# Patient Record
Sex: FEMALE | Race: WHITE | NOT HISPANIC OR LATINO | Employment: FULL TIME | URBAN - METROPOLITAN AREA
[De-identification: names, ages, dates, MRNs, and addresses within clinical notes are randomized per-mention and may not be internally consistent; named-entity substitution may affect disease eponyms.]

---

## 2017-05-08 ENCOUNTER — HOSPITAL ENCOUNTER (OUTPATIENT)
Dept: OTHER | Facility: HOSPITAL | Age: 47
Discharge: HOME | End: 2017-05-08
Attending: FAMILY MEDICINE | Admitting: FAMILY MEDICINE

## 2017-05-12 ENCOUNTER — CONVERSION ENCOUNTER (OUTPATIENT)
Dept: FAMILY MEDICINE CLINIC | Facility: CLINIC | Age: 47
End: 2017-05-12

## 2017-05-13 LAB
ALBUMIN SERPL-MCNC: 3.9 G/DL (ref 3.6–5.1)
ALP SERPL-CCNC: 53 U/L (ref 33–115)
AST SERPL-CCNC: 19 U/L (ref 10–30)
BILIRUB SERPL-MCNC: 0.6 MG/DL (ref 0.2–1.2)
BUN SERPL-MCNC: 12 MG/DL (ref 7–25)
BUN/CREAT SERPL: 17.1 (CALC) (ref 6–22)
CALCIUM SERPL-MCNC: 9.1 MG/DL (ref 8.6–10.2)
CHLORIDE SERPL-SCNC: 105 MMOL/L (ref 98–110)
CHOLEST SERPL-MCNC: 213 MG/DL (ref 125–200)
CONV CO2: 24 MMOL/L (ref 21–33)
CONV TOTAL PROTEIN: 6.4 G/DL (ref 6.2–8.3)
CREAT UR-MCNC: 0.7 MG/DL (ref 0.59–1.07)
EOSINOPHIL # BLD AUTO: 200 CELLS/UL (ref 15–500)
EOSINOPHIL # BLD AUTO: 3 %
ERYTHROCYTE [DISTWIDTH] IN BLOOD BY AUTOMATED COUNT: 12.5 % (ref 11–15)
GLOBULIN UR ELPH-MCNC: 2.5 MG/DL (ref 2.2–3.9)
GLUCOSE UR QL: 99 MG/DL (ref 65–99)
HCT VFR BLD AUTO: 38.7 % (ref 35–45)
HDLC SERPL-MCNC: 52 MG/DL
HGB BLD-MCNC: 13.1 G/DL (ref 11.7–15.5)
INSULIN SERPL-ACNC: 1.6 (CALC) (ref 1–2.1)
LDLC SERPL CALC-MCNC: 128 MG/DL
LYMPHOCYTES # BLD AUTO: 1500 CELLS/UL (ref 850–3900)
LYMPHOCYTES NFR BLD AUTO: 23 %
MCH RBC QN AUTO: 31 PG (ref 27–33)
MCHC RBC AUTO-ENTMCNC: 33.9 G/DL (ref 32–36)
MCV RBC AUTO: 91.2 FL (ref 80–100)
MONOCYTES # BLD AUTO: 300 CELLS/UL (ref 200–950)
MONOCYTES NFR BLD AUTO: 5 %
NEUTROPHILS # BLD AUTO: 4300 CELLS/UL (ref 1500–7800)
NEUTROPHILS NFR BLD AUTO: 68 %
PLATELET # BLD AUTO: 280 10*3/UL (ref 140–400)
PMV BLD AUTO: 8.9 FL (ref 7.5–11.5)
POTASSIUM SERPL-SCNC: 4.4 MMOL/L (ref 3.5–5.3)
RBC # BLD AUTO: 4.24 MILLION/UL (ref 3.8–5.1)
SODIUM SERPL-SCNC: 139 MMOL/L (ref 135–146)
TRIGL SERPL-MCNC: 164 MG/DL
TSH SERPL-ACNC: 2.07 MIU/L
WBC # BLD AUTO: 6.3 10*3/UL (ref 3.8–10.8)

## 2018-03-08 ENCOUNTER — OFFICE (AMBULATORY)
Dept: URBAN - METROPOLITAN AREA CLINIC 64 | Facility: CLINIC | Age: 48
End: 2018-03-08

## 2018-03-08 VITALS
HEART RATE: 71 BPM | DIASTOLIC BLOOD PRESSURE: 75 MMHG | HEIGHT: 61 IN | SYSTOLIC BLOOD PRESSURE: 119 MMHG | WEIGHT: 140 LBS

## 2018-03-08 DIAGNOSIS — K57.30 DIVERTICULOSIS OF LARGE INTESTINE WITHOUT PERFORATION OR ABS: ICD-10-CM

## 2018-03-08 DIAGNOSIS — K64.8 OTHER HEMORRHOIDS: ICD-10-CM

## 2018-03-08 DIAGNOSIS — K21.9 GASTRO-ESOPHAGEAL REFLUX DISEASE WITHOUT ESOPHAGITIS: ICD-10-CM

## 2018-03-08 DIAGNOSIS — R13.10 DYSPHAGIA, UNSPECIFIED: ICD-10-CM

## 2018-03-08 DIAGNOSIS — K59.00 CONSTIPATION, UNSPECIFIED: ICD-10-CM

## 2018-03-08 DIAGNOSIS — Z80.0 FAMILY HISTORY OF MALIGNANT NEOPLASM OF DIGESTIVE ORGANS: ICD-10-CM

## 2018-03-08 PROCEDURE — 99203 OFFICE O/P NEW LOW 30 MIN: CPT | Performed by: NURSE PRACTITIONER

## 2018-03-08 RX ORDER — SUCRALFATE 1 G/10ML
800 SUSPENSION ORAL
Qty: 500 | Refills: 0 | Status: COMPLETED
Start: 2018-03-08 | End: 2018-03-16

## 2018-03-19 ENCOUNTER — OFFICE (AMBULATORY)
Dept: URBAN - METROPOLITAN AREA PATHOLOGY 4 | Facility: PATHOLOGY | Age: 48
End: 2018-03-19
Payer: COMMERCIAL

## 2018-03-19 ENCOUNTER — ON CAMPUS - OUTPATIENT (AMBULATORY)
Dept: URBAN - METROPOLITAN AREA HOSPITAL 2 | Facility: HOSPITAL | Age: 48
End: 2018-03-19
Payer: COMMERCIAL

## 2018-03-19 VITALS
SYSTOLIC BLOOD PRESSURE: 154 MMHG | OXYGEN SATURATION: 99 % | SYSTOLIC BLOOD PRESSURE: 100 MMHG | SYSTOLIC BLOOD PRESSURE: 88 MMHG | OXYGEN SATURATION: 100 % | SYSTOLIC BLOOD PRESSURE: 119 MMHG | HEART RATE: 79 BPM | HEART RATE: 100 BPM | HEIGHT: 61 IN | HEART RATE: 114 BPM | WEIGHT: 138 LBS | RESPIRATION RATE: 18 BRPM | DIASTOLIC BLOOD PRESSURE: 69 MMHG | SYSTOLIC BLOOD PRESSURE: 106 MMHG | SYSTOLIC BLOOD PRESSURE: 138 MMHG | HEART RATE: 89 BPM | SYSTOLIC BLOOD PRESSURE: 91 MMHG | RESPIRATION RATE: 16 BRPM | DIASTOLIC BLOOD PRESSURE: 91 MMHG | TEMPERATURE: 97.5 F | HEART RATE: 84 BPM | HEART RATE: 72 BPM | DIASTOLIC BLOOD PRESSURE: 53 MMHG | DIASTOLIC BLOOD PRESSURE: 59 MMHG | HEART RATE: 92 BPM | HEART RATE: 75 BPM | SYSTOLIC BLOOD PRESSURE: 103 MMHG | DIASTOLIC BLOOD PRESSURE: 79 MMHG | DIASTOLIC BLOOD PRESSURE: 62 MMHG | DIASTOLIC BLOOD PRESSURE: 61 MMHG | DIASTOLIC BLOOD PRESSURE: 57 MMHG | SYSTOLIC BLOOD PRESSURE: 130 MMHG | OXYGEN SATURATION: 98 % | OXYGEN SATURATION: 96 %

## 2018-03-19 DIAGNOSIS — R13.10 DYSPHAGIA, UNSPECIFIED: ICD-10-CM

## 2018-03-19 DIAGNOSIS — D12.2 BENIGN NEOPLASM OF ASCENDING COLON: ICD-10-CM

## 2018-03-19 DIAGNOSIS — K21.9 GASTRO-ESOPHAGEAL REFLUX DISEASE WITHOUT ESOPHAGITIS: ICD-10-CM

## 2018-03-19 DIAGNOSIS — K62.1 RECTAL POLYP: ICD-10-CM

## 2018-03-19 DIAGNOSIS — K64.8 OTHER HEMORRHOIDS: ICD-10-CM

## 2018-03-19 DIAGNOSIS — K57.30 DIVERTICULOSIS OF LARGE INTESTINE WITHOUT PERFORATION OR ABS: ICD-10-CM

## 2018-03-19 DIAGNOSIS — K44.9 DIAPHRAGMATIC HERNIA WITHOUT OBSTRUCTION OR GANGRENE: ICD-10-CM

## 2018-03-19 DIAGNOSIS — K22.2 ESOPHAGEAL OBSTRUCTION: ICD-10-CM

## 2018-03-19 DIAGNOSIS — Z80.0 FAMILY HISTORY OF MALIGNANT NEOPLASM OF DIGESTIVE ORGANS: ICD-10-CM

## 2018-03-19 LAB
GI HISTOLOGY: A. UNSPECIFIED: (no result)
GI HISTOLOGY: B. UNSPECIFIED: (no result)
GI HISTOLOGY: PDF REPORT: (no result)

## 2018-03-19 PROCEDURE — 43249 ESOPH EGD DILATION <30 MM: CPT | Mod: 59 | Performed by: INTERNAL MEDICINE

## 2018-03-19 PROCEDURE — 45380 COLONOSCOPY AND BIOPSY: CPT | Mod: 33 | Performed by: INTERNAL MEDICINE

## 2018-03-19 PROCEDURE — 88305 TISSUE EXAM BY PATHOLOGIST: CPT | Mod: 33 | Performed by: INTERNAL MEDICINE

## 2018-03-19 RX ADMIN — PROPOFOL: 10 INJECTION, EMULSION INTRAVENOUS at 14:47

## 2018-05-21 ENCOUNTER — HOSPITAL ENCOUNTER (OUTPATIENT)
Dept: GENERAL RADIOLOGY | Facility: HOSPITAL | Age: 48
Discharge: HOME | End: 2018-05-21
Attending: FAMILY MEDICINE | Admitting: FAMILY MEDICINE

## 2018-05-21 ENCOUNTER — CONVERSION ENCOUNTER (OUTPATIENT)
Dept: FAMILY MEDICINE CLINIC | Facility: CLINIC | Age: 48
End: 2018-05-21

## 2018-05-22 LAB
ALBUMIN SERPL-MCNC: 4 G/DL (ref 3.6–5.1)
ALP SERPL-CCNC: 56 U/L (ref 33–115)
AST SERPL-CCNC: 26 U/L (ref 10–30)
BILIRUB SERPL-MCNC: 0.6 MG/DL (ref 0.2–1.2)
BUN SERPL-MCNC: 15 MG/DL (ref 7–25)
BUN/CREAT SERPL: 18.8 (CALC) (ref 6–22)
CALCIUM SERPL-MCNC: 9.4 MG/DL (ref 8.6–10.2)
CHLORIDE SERPL-SCNC: 104 MMOL/L (ref 98–110)
CHOLEST SERPL-MCNC: 195 MG/DL (ref 125–200)
CONV CO2: 25 MMOL/L (ref 21–33)
CONV TOTAL PROTEIN: 6.4 G/DL (ref 6.2–8.3)
CREAT UR-MCNC: 0.8 MG/DL (ref 0.59–1.07)
EOSINOPHIL # BLD AUTO: 300 CELLS/UL (ref 15–500)
EOSINOPHIL # BLD AUTO: 5 %
ERYTHROCYTE [DISTWIDTH] IN BLOOD BY AUTOMATED COUNT: 12.8 % (ref 11–15)
GLOBULIN UR ELPH-MCNC: 2.4 MG/DL (ref 2.2–3.9)
GLUCOSE UR QL: 105 MG/DL (ref 65–99)
HCT VFR BLD AUTO: 37.4 % (ref 35–45)
HDLC SERPL-MCNC: 56 MG/DL
HGB BLD-MCNC: 12.6 G/DL (ref 11.7–15.5)
INSULIN SERPL-ACNC: 1.7 (CALC) (ref 1–2.1)
LDLC SERPL CALC-MCNC: 111 MG/DL
LYMPHOCYTES # BLD AUTO: 1800 CELLS/UL (ref 850–3900)
LYMPHOCYTES NFR BLD AUTO: 31 %
MCH RBC QN AUTO: 30.8 PG (ref 27–33)
MCHC RBC AUTO-ENTMCNC: 33.7 G/DL (ref 32–36)
MCV RBC AUTO: 91.5 FL (ref 80–100)
MONOCYTES # BLD AUTO: 400 CELLS/UL (ref 200–950)
MONOCYTES NFR BLD AUTO: 7 %
NEUTROPHILS # BLD AUTO: 3400 CELLS/UL (ref 1500–7800)
NEUTROPHILS NFR BLD AUTO: 57 %
PLATELET # BLD AUTO: 291 10*3/UL (ref 140–400)
PMV BLD AUTO: 8.7 FL (ref 7.5–11.5)
POTASSIUM SERPL-SCNC: 4.1 MMOL/L (ref 3.5–5.3)
RBC # BLD AUTO: 4.09 MILLION/UL (ref 3.8–5.1)
SODIUM SERPL-SCNC: 137 MMOL/L (ref 135–146)
TRIGL SERPL-MCNC: 141 MG/DL
TSH SERPL-ACNC: 1.79 MIU/L
WBC # BLD AUTO: 5.9 10*3/UL (ref 3.8–10.8)

## 2019-05-02 ENCOUNTER — HOSPITAL ENCOUNTER (OUTPATIENT)
Dept: OTHER | Facility: HOSPITAL | Age: 49
Discharge: HOME | End: 2019-05-02
Attending: FAMILY MEDICINE | Admitting: FAMILY MEDICINE

## 2019-05-14 ENCOUNTER — HOSPITAL ENCOUNTER (OUTPATIENT)
Dept: MAMMOGRAPHY | Facility: HOSPITAL | Age: 49
Discharge: HOME | End: 2019-05-14
Attending: FAMILY MEDICINE | Admitting: FAMILY MEDICINE

## 2019-12-05 ENCOUNTER — OFFICE VISIT (OUTPATIENT)
Dept: FAMILY MEDICINE CLINIC | Facility: CLINIC | Age: 49
End: 2019-12-05

## 2019-12-05 ENCOUNTER — HOSPITAL ENCOUNTER (OUTPATIENT)
Dept: GENERAL RADIOLOGY | Facility: HOSPITAL | Age: 49
Discharge: HOME | End: 2019-12-05
Admitting: FAMILY MEDICINE

## 2019-12-05 VITALS
HEART RATE: 103 BPM | BODY MASS INDEX: 27.38 KG/M2 | TEMPERATURE: 98.6 F | DIASTOLIC BLOOD PRESSURE: 68 MMHG | RESPIRATION RATE: 16 BRPM | HEIGHT: 61 IN | OXYGEN SATURATION: 97 % | WEIGHT: 145 LBS | SYSTOLIC BLOOD PRESSURE: 112 MMHG

## 2019-12-05 DIAGNOSIS — E66.3 OVERWEIGHT (BMI 25.0-29.9): Primary | ICD-10-CM

## 2019-12-05 DIAGNOSIS — Z87.891 HISTORY OF TOBACCO USE: ICD-10-CM

## 2019-12-05 DIAGNOSIS — R07.81 RIB PAIN ON LEFT SIDE: ICD-10-CM

## 2019-12-05 PROBLEM — Z12.31 ENCOUNTER FOR SCREENING MAMMOGRAM FOR MALIGNANT NEOPLASM OF BREAST: Status: ACTIVE | Noted: 2019-12-05

## 2019-12-05 PROBLEM — M54.50 ACUTE BILATERAL LOW BACK PAIN WITHOUT SCIATICA: Status: ACTIVE | Noted: 2019-12-05

## 2019-12-05 PROBLEM — K58.9 IRRITABLE COLON: Status: ACTIVE | Noted: 2019-12-05

## 2019-12-05 PROBLEM — J30.1 ALLERGIC RHINITIS DUE TO POLLEN: Status: ACTIVE | Noted: 2019-12-05

## 2019-12-05 PROBLEM — R40.0 DAYTIME SOMNOLENCE: Status: ACTIVE | Noted: 2019-12-05

## 2019-12-05 PROBLEM — R92.2 DENSE BREAST: Status: ACTIVE | Noted: 2019-12-05

## 2019-12-05 PROBLEM — R92.30 DENSE BREAST: Status: ACTIVE | Noted: 2019-12-05

## 2019-12-05 PROBLEM — Z00.01 ENCOUNTER FOR ANNUAL GENERAL MEDICAL EXAMINATION WITH ABNORMAL FINDINGS IN ADULT: Status: ACTIVE | Noted: 2019-12-05

## 2019-12-05 PROBLEM — F41.9 ANXIETY: Status: ACTIVE | Noted: 2019-12-05

## 2019-12-05 PROBLEM — R56.9 CONVULSIONS: Status: ACTIVE | Noted: 2019-12-05

## 2019-12-05 PROBLEM — K21.9 GASTROESOPHAGEAL REFLUX DISEASE: Status: ACTIVE | Noted: 2019-12-05

## 2019-12-05 PROCEDURE — 71101 X-RAY EXAM UNILAT RIBS/CHEST: CPT

## 2019-12-05 PROCEDURE — 99214 OFFICE O/P EST MOD 30 MIN: CPT | Performed by: FAMILY MEDICINE

## 2019-12-05 RX ORDER — POLYETHYLENE GLYCOL 3350 17 G/17G
POWDER, FOR SOLUTION ORAL DAILY
COMMUNITY

## 2019-12-05 RX ORDER — FEXOFENADINE HCL 180 MG/1
TABLET ORAL DAILY
COMMUNITY

## 2019-12-05 NOTE — PROGRESS NOTES
Subjective   Aure Bernard is a 49 y.o. female.     Chief Complaint   Patient presents with   • Chest Pain     pain in left rib area       Chest Pain    This is a new problem. The current episode started more than 1 month ago. The problem occurs constantly. The problem has been gradually worsening. The pain is moderate. The quality of the pain is described as pressure. The pain does not radiate. Pertinent negatives include no abdominal pain, cough, diaphoresis, fever, irregular heartbeat, nausea, palpitations, shortness of breath or vomiting. Associated with: position in how she sits or leans. She has tried rest for the symptoms. The treatment provided no relief.   Pertinent negatives for past medical history include no seizures.        The following portions of the patient's history were reviewed and updated as appropriate: allergies, current medications, past family history, past medical history, past social history, past surgical history and problem list.    Allergies:  No Known Allergies    Social History:  Social History     Socioeconomic History   • Marital status:      Spouse name: Not on file   • Number of children: Not on file   • Years of education: Not on file   • Highest education level: Not on file   Tobacco Use   • Smoking status: Former Smoker     Types: Cigarettes   • Smokeless tobacco: Never Used   • Tobacco comment: quit [id number]   Substance and Sexual Activity   • Alcohol use: Yes   • Drug use: No   • Sexual activity: Defer       Family History:  History reviewed. No pertinent family history.    Past Medical History :  Patient Active Problem List   Diagnosis   • Acute bilateral low back pain without sciatica   • Allergic rhinitis due to pollen   • Anxiety   • Convulsions (CMS/HCC)   • Daytime somnolence   • Dense breast   • Gastroesophageal reflux disease   • Irritable colon   • Overweight (BMI 25.0-29.9)   • Encounter for screening mammogram for malignant neoplasm of breast   • Encounter for  "annual general medical examination with abnormal findings in adult   • History of tobacco use   • Opacity of lung on imaging study       Medication List:    Current Outpatient Medications:   •  fexofenadine (ALLEGRA) 180 MG tablet, Take  by mouth Daily., Disp: , Rfl:   •  Omeprazole 20 MG Tablet Delayed Release Dispersible, Take  by mouth Daily., Disp: , Rfl:   •  polyethylene glycol (MIRALAX) powder, Take  by mouth Daily., Disp: , Rfl:     Past Surgical History:  History reviewed. No pertinent surgical history.    Review of Systems:  Review of Systems   Constitutional: Negative for chills, diaphoresis and fever.   Eyes: Negative for visual disturbance.   Respiratory: Negative for cough and shortness of breath.    Cardiovascular: Positive for chest pain. Negative for palpitations.   Gastrointestinal: Negative for abdominal pain, nausea and vomiting.   Endocrine: Negative for polydipsia and polyphagia.   Musculoskeletal: Negative for neck stiffness.   Skin: Negative for color change and pallor.   Neurological: Negative for seizures and syncope.   Hematological: Negative for adenopathy.       Physical Exam:  Vital Signs:  Visit Vitals  /68 (BP Location: Right arm, Patient Position: Sitting, Cuff Size: Adult)   Pulse 103   Temp 98.6 °F (37 °C)   Resp 16   Ht 154.9 cm (61\")   Wt 65.8 kg (145 lb)   SpO2 97%   Breastfeeding No   BMI 27.40 kg/m²       Physical Exam   Constitutional: She is oriented to person, place, and time. She appears well-developed and well-nourished. She is cooperative.   Cardiovascular: Normal rate, regular rhythm and normal heart sounds. Exam reveals no gallop and no friction rub.   No murmur heard.  Pulmonary/Chest: Effort normal and breath sounds normal. She has no wheezes. She has no rales. She exhibits no tenderness.   Neurological: She is alert and oriented to person, place, and time. Coordination normal.   Skin: Skin is warm and dry.   Psychiatric: She has a normal mood and affect. "   Vitals reviewed.      Assessment and Plan:  Problem List Items Addressed This Visit        Other    Overweight (BMI 25.0-29.9) - Primary    History of tobacco use      Other Visit Diagnoses     Rib pain on left side        Relevant Orders    XR Ribs Left With PA Chest (Completed)        Chest exam negative. Will get xray. Coming from her back?    An After Visit Summary and PPPS were given to the patient.

## 2019-12-09 ENCOUNTER — TELEPHONE (OUTPATIENT)
Dept: FAMILY MEDICINE CLINIC | Facility: CLINIC | Age: 49
End: 2019-12-09

## 2019-12-09 DIAGNOSIS — R93.89 ABNORMAL X-RAY: Primary | ICD-10-CM

## 2019-12-09 PROBLEM — R91.8 OPACITY OF LUNG ON IMAGING STUDY: Status: ACTIVE | Noted: 2019-12-09

## 2019-12-09 NOTE — TELEPHONE ENCOUNTER
----- Message from Alia Roe MD sent at 12/5/2019  5:37 PM EST -----  There is a spot in the lower left lung. I need a ct of the chest, I believe without contrast

## 2019-12-10 ENCOUNTER — HOSPITAL ENCOUNTER (OUTPATIENT)
Dept: CT IMAGING | Facility: HOSPITAL | Age: 49
Discharge: HOME | End: 2019-12-10
Admitting: FAMILY MEDICINE

## 2019-12-10 DIAGNOSIS — R93.89 ABNORMAL X-RAY: ICD-10-CM

## 2019-12-10 PROCEDURE — 71250 CT THORAX DX C-: CPT

## 2019-12-10 NOTE — PROGRESS NOTES
CT came back fine as far as the spot on her lung. She has a hiatal hernia (that is part of the stomach slides into the chest, causes heart burn), She has some scarring that we all get living in the [facility]. Otherwise normal

## 2020-12-15 ENCOUNTER — HOSPITAL ENCOUNTER (OUTPATIENT)
Dept: GENERAL RADIOLOGY | Facility: HOSPITAL | Age: 50
Discharge: HOME | End: 2020-12-15
Admitting: FAMILY MEDICINE

## 2020-12-15 ENCOUNTER — OFFICE VISIT (OUTPATIENT)
Dept: FAMILY MEDICINE CLINIC | Facility: CLINIC | Age: 50
End: 2020-12-15

## 2020-12-15 VITALS
WEIGHT: 144 LBS | HEART RATE: 97 BPM | RESPIRATION RATE: 18 BRPM | DIASTOLIC BLOOD PRESSURE: 86 MMHG | SYSTOLIC BLOOD PRESSURE: 124 MMHG | HEIGHT: 61 IN | BODY MASS INDEX: 27.19 KG/M2 | OXYGEN SATURATION: 97 % | TEMPERATURE: 97.1 F

## 2020-12-15 DIAGNOSIS — Z12.31 SCREENING MAMMOGRAM, ENCOUNTER FOR: ICD-10-CM

## 2020-12-15 DIAGNOSIS — N95.1 MENOPAUSAL SYMPTOM: ICD-10-CM

## 2020-12-15 DIAGNOSIS — M25.511 ACUTE PAIN OF RIGHT SHOULDER: ICD-10-CM

## 2020-12-15 DIAGNOSIS — Z00.00 ENCOUNTER FOR WELLNESS EXAMINATION IN ADULT: Primary | ICD-10-CM

## 2020-12-15 DIAGNOSIS — Z13.220 SCREENING FOR HYPERLIPIDEMIA: ICD-10-CM

## 2020-12-15 DIAGNOSIS — Z90.710 H/O: HYSTERECTOMY: ICD-10-CM

## 2020-12-15 PROBLEM — R91.8 OPACITY OF LUNG ON IMAGING STUDY: Status: RESOLVED | Noted: 2019-12-09 | Resolved: 2020-12-15

## 2020-12-15 PROBLEM — M54.50 ACUTE BILATERAL LOW BACK PAIN WITHOUT SCIATICA: Status: RESOLVED | Noted: 2019-12-05 | Resolved: 2020-12-15

## 2020-12-15 LAB
BILIRUB BLD-MCNC: NEGATIVE MG/DL
CLARITY, POC: CLEAR
COLOR UR: YELLOW
GLUCOSE UR STRIP-MCNC: NEGATIVE MG/DL
KETONES UR QL: NEGATIVE
LEUKOCYTE EST, POC: NEGATIVE
NITRITE UR-MCNC: NEGATIVE MG/ML
PH UR: 6 [PH] (ref 5–8)
PROT UR STRIP-MCNC: NEGATIVE MG/DL
RBC # UR STRIP: NEGATIVE /UL
SP GR UR: 1.02 (ref 1–1.03)
UROBILINOGEN UR QL: NORMAL

## 2020-12-15 PROCEDURE — 99213 OFFICE O/P EST LOW 20 MIN: CPT | Performed by: FAMILY MEDICINE

## 2020-12-15 PROCEDURE — 81003 URINALYSIS AUTO W/O SCOPE: CPT | Performed by: FAMILY MEDICINE

## 2020-12-15 PROCEDURE — 99396 PREV VISIT EST AGE 40-64: CPT | Performed by: FAMILY MEDICINE

## 2020-12-15 PROCEDURE — 73030 X-RAY EXAM OF SHOULDER: CPT

## 2020-12-15 NOTE — PROGRESS NOTES
"  Chief Complaint   Patient presents with   • Annual Exam   • Dizziness   • Shoulder Pain         Subjective   Aure Bernard is a 50 y.o. female here for a Well Woman Visit. Energy level is described as fair and she is sleeping well. She sleeps 8 hours nightly. Hysterectomy. Contraception: none. Patient exercises regularly 3 times weekly. Nutrition is described as in general, a \"healthy\" diet  . Her   libido is abnormal. She reports that she does perform monthly self breast exam.      Everytime pt bends over she gets dizzy.   right arm pain muscle pain.       Dizziness  This is a new problem. The current episode started 1 to 4 weeks ago. The problem occurs constantly. The problem has been gradually worsening. Associated symptoms include fatigue and headaches. Pertinent negatives include no abdominal pain, arthralgias, change in bowel habit, chest pain, chills, congestion, coughing, fever, joint swelling, nausea, numbness, rash, sore throat, swollen glands, visual change or vomiting. The symptoms are aggravated by bending. She has tried nothing for the symptoms. The treatment provided no relief.   Arm Pain   There was no injury mechanism. The pain is present in the right shoulder (right upper arm). The quality of the pain is described as aching and cramping (feels like muscle is pulling). The pain does not radiate. The pain has been fluctuating since the incident. Pertinent negatives include no chest pain, muscle weakness, numbness or tingling. Nothing aggravates the symptoms. She has tried nothing for the symptoms. The treatment provided no relief.        I personally reviewed and updated the patient's allergies, medications, problem list, and past medical, surgical, social, and family history.     Allergies:  No Known Allergies    Social History:  Social History     Socioeconomic History   • Marital status:      Spouse name: Not on file   • Number of children: Not on file   • Years of education: Not on file "   • Highest education level: Not on file   Tobacco Use   • Smoking status: Former Smoker     Types: Cigarettes   • Smokeless tobacco: Never Used   • Tobacco comment: quit [id number]   Substance and Sexual Activity   • Alcohol use: Yes   • Drug use: No   • Sexual activity: Defer       Family History:  Family History   Problem Relation Age of Onset   • Colon cancer Maternal Grandfather        Past Medical History :  Active Ambulatory Problems     Diagnosis Date Noted   • Allergic rhinitis due to pollen 12/05/2019   • Anxiety 12/05/2019   • Convulsions (CMS/HCC) 12/05/2019   • Daytime somnolence 12/05/2019   • Dense breast 12/05/2019   • Gastroesophageal reflux disease 12/05/2019   • Irritable colon 12/05/2019   • Overweight (BMI 25.0-29.9) 12/05/2019   • Encounter for screening mammogram for malignant neoplasm of breast 12/05/2019   • Encounter for annual general medical examination with abnormal findings in adult 12/05/2019   • History of tobacco use 12/05/2019   • H/O: hysterectomy 12/15/2020   • Acute pain of right shoulder 12/15/2020   • Screening mammogram, encounter for 12/15/2020   • Menopausal symptom 12/15/2020     Resolved Ambulatory Problems     Diagnosis Date Noted   • Acute bilateral low back pain without sciatica 12/05/2019   • Opacity of lung on imaging study 12/09/2019     Past Medical History:   Diagnosis Date   • GERD with esophagitis    • Headache    • Seizures (CMS/HCC)        Medication List:    Current Outpatient Medications:   •  fexofenadine (ALLEGRA) 180 MG tablet, Take  by mouth Daily., Disp: , Rfl:   •  Omeprazole 20 MG Tablet Delayed Release Dispersible, Take  by mouth Daily., Disp: , Rfl:   •  polyethylene glycol (MIRALAX) powder, Take  by mouth Daily., Disp: , Rfl:     Past Surgical History:  History reviewed. No pertinent surgical history.    Depression Screen:   PHQ-2/PHQ-9 Depression Screening 12/5/2019   Little interest or pleasure in doing things 0   Feeling down, depressed, or hopeless  "0   Total Score 0       Fall Risk Screen:  LARRY Fall Risk Assessment has not been completed.    Review Of Systems:  Review of Systems   Constitutional: Positive for fatigue. Negative for activity change, chills and fever.   HENT: Negative for congestion, ear pain, postnasal drip, rhinorrhea, sinus pressure, sneezing, sore throat, swollen glands, trouble swallowing and voice change.    Eyes: Negative for pain, redness, itching and visual disturbance.   Respiratory: Negative for cough, shortness of breath and wheezing.    Cardiovascular: Negative for chest pain.   Gastrointestinal: Negative for abdominal pain, change in bowel habit, diarrhea, nausea and vomiting.   Endocrine: Negative for cold intolerance and heat intolerance.   Genitourinary: Negative for frequency and urgency.   Musculoskeletal: Negative for arthralgias and joint swelling.   Skin: Negative for rash.   Neurological: Positive for dizziness. Negative for tingling, syncope and numbness.   Hematological: Does not bruise/bleed easily.   Psychiatric/Behavioral: Negative for depressed mood. The patient is not nervous/anxious.        Physical Exam:  Vital Signs:  Visit Vitals  /86   Pulse 97   Temp 97.1 °F (36.2 °C)   Resp 18   Ht 154.9 cm (61\")   Wt 65.3 kg (144 lb)   SpO2 97%   BMI 27.21 kg/m²       Physical Exam  Vitals signs reviewed.   Constitutional:       Appearance: Normal appearance. She is well-developed.   HENT:      Head: Normocephalic and atraumatic.      Right Ear: External ear normal. No decreased hearing noted. No tenderness. No middle ear effusion. Tympanic membrane is not injected, erythematous, retracted or bulging.      Left Ear: External ear normal. No decreased hearing noted. No tenderness.  No middle ear effusion. Tympanic membrane is not injected, erythematous, retracted or bulging.      Nose: Nose normal. No rhinorrhea.      Mouth/Throat:      Pharynx: No oropharyngeal exudate or posterior oropharyngeal erythema. "   Cardiovascular:      Rate and Rhythm: Normal rate and regular rhythm.      Heart sounds: Normal heart sounds. No murmur. No friction rub. No gallop.    Pulmonary:      Effort: Pulmonary effort is normal. No respiratory distress.      Breath sounds: Normal breath sounds. No wheezing or rales.   Lymphadenopathy:      Cervical: No cervical adenopathy.   Skin:     General: Skin is warm and dry.   Neurological:      General: No focal deficit present.      Mental Status: She is alert and oriented to person, place, and time. Mental status is at baseline.      Cranial Nerves: No cranial nerve deficit.      Motor: No weakness.      Coordination: Coordination normal.      Gait: Gait normal.   Psychiatric:         Behavior: Behavior is cooperative.           Assessment and Plan:  Problem List Items Addressed This Visit        Nervous and Auditory    Acute pain of right shoulder    Current Assessment & Plan     Will get xray, place in PT         Relevant Orders    XR Shoulder 2+ View Right (Completed)    Ambulatory Referral to Physical Therapy Evaluate and treat       Genitourinary    Menopausal symptom    Relevant Orders    Luteinizing Hormone (Completed)    Follicle Stimulating Hormone (Completed)       Other    H/O: hysterectomy    Overview     Not for cancer, ovaries remain         Screening mammogram, encounter for    Relevant Orders    Mammo Screening Digital Tomosynthesis Bilateral With CAD      Other Visit Diagnoses     Encounter for wellness examination in adult    -  Primary    Relevant Orders    POC Urinalysis Dipstick, Multipro (Completed)    CBC & Differential (Completed)    Comprehensive Metabolic Panel (Completed)    TSH (Completed)    Screening for hyperlipidemia        Relevant Orders    Lipid Panel With / Chol / HDL Ratio (Completed)          An After Visit Summary and PPPS were given to the patient.       Discussed injury prevention, diet and exercise, safe sexual practices, and screening for common  diseases. Encouraged use of sunscreen and seatbelts. Discussed timing of  cervical cancer screening. Encouraged monthly self-breast exams, yearly clinical breast exams, and discussed timing of mammograms. Avoidance of tobacco encouraged. Limitation or avoidance of alcohol encouraged. Recommend yearly dental and eye exams. Also discussed monitoring of blood pressure, lipids.     I wore protective equipment throughout this patient encounter to include mask, gloves and eye protection. Hand hygiene was performed before donning protective equipment and after removal when leaving the room.

## 2020-12-17 ENCOUNTER — TREATMENT (OUTPATIENT)
Dept: PHYSICAL THERAPY | Facility: CLINIC | Age: 50
End: 2020-12-17

## 2020-12-17 DIAGNOSIS — M25.511 ACUTE PAIN OF RIGHT SHOULDER: Primary | ICD-10-CM

## 2020-12-17 LAB
ALBUMIN SERPL-MCNC: 4.6 G/DL (ref 3.8–4.8)
ALBUMIN/GLOB SERPL: 1.7 {RATIO} (ref 1.2–2.2)
ALP SERPL-CCNC: 83 IU/L (ref 39–117)
ALT SERPL-CCNC: 37 IU/L (ref 0–32)
AST SERPL-CCNC: 19 IU/L (ref 0–40)
BASOPHILS # BLD AUTO: 0 X10E3/UL (ref 0–0.2)
BASOPHILS NFR BLD AUTO: 1 %
BILIRUB SERPL-MCNC: 0.4 MG/DL (ref 0–1.2)
BUN SERPL-MCNC: 18 MG/DL (ref 6–24)
BUN/CREAT SERPL: 23 (ref 9–23)
CALCIUM SERPL-MCNC: 10.2 MG/DL (ref 8.7–10.2)
CHLORIDE SERPL-SCNC: 101 MMOL/L (ref 96–106)
CHOLEST SERPL-MCNC: 235 MG/DL (ref 100–199)
CHOLEST/HDLC SERPL: 4.2 RATIO (ref 0–4.4)
CO2 SERPL-SCNC: 25 MMOL/L (ref 20–29)
CREAT SERPL-MCNC: 0.79 MG/DL (ref 0.57–1)
EOSINOPHIL # BLD AUTO: 0.3 X10E3/UL (ref 0–0.4)
EOSINOPHIL NFR BLD AUTO: 4 %
ERYTHROCYTE [DISTWIDTH] IN BLOOD BY AUTOMATED COUNT: 11.7 % (ref 11.7–15.4)
FSH SERPL-ACNC: 70.2 MIU/ML
GLOBULIN SER CALC-MCNC: 2.7 G/DL (ref 1.5–4.5)
GLUCOSE SERPL-MCNC: 106 MG/DL (ref 65–99)
HCT VFR BLD AUTO: 39.3 % (ref 34–46.6)
HDLC SERPL-MCNC: 56 MG/DL
HGB BLD-MCNC: 13.7 G/DL (ref 11.1–15.9)
IMM GRANULOCYTES # BLD AUTO: 0 X10E3/UL (ref 0–0.1)
IMM GRANULOCYTES NFR BLD AUTO: 0 %
LDLC SERPL CALC-MCNC: 142 MG/DL (ref 0–99)
LH SERPL-ACNC: 41.7 MIU/ML
LYMPHOCYTES # BLD AUTO: 1.9 X10E3/UL (ref 0.7–3.1)
LYMPHOCYTES NFR BLD AUTO: 31 %
MCH RBC QN AUTO: 31.6 PG (ref 26.6–33)
MCHC RBC AUTO-ENTMCNC: 34.9 G/DL (ref 31.5–35.7)
MCV RBC AUTO: 91 FL (ref 79–97)
MONOCYTES # BLD AUTO: 0.4 X10E3/UL (ref 0.1–0.9)
MONOCYTES NFR BLD AUTO: 6 %
NEUTROPHILS # BLD AUTO: 3.4 X10E3/UL (ref 1.4–7)
NEUTROPHILS NFR BLD AUTO: 58 %
PLATELET # BLD AUTO: 330 X10E3/UL (ref 150–450)
POTASSIUM SERPL-SCNC: 5 MMOL/L (ref 3.5–5.2)
PROT SERPL-MCNC: 7.3 G/DL (ref 6–8.5)
RBC # BLD AUTO: 4.34 X10E6/UL (ref 3.77–5.28)
SODIUM SERPL-SCNC: 140 MMOL/L (ref 134–144)
TRIGL SERPL-MCNC: 207 MG/DL (ref 0–149)
TSH SERPL DL<=0.005 MIU/L-ACNC: 1.47 UIU/ML (ref 0.45–4.5)
VLDLC SERPL CALC-MCNC: 37 MG/DL (ref 5–40)
WBC # BLD AUTO: 6 X10E3/UL (ref 3.4–10.8)

## 2020-12-17 PROCEDURE — 97162 PT EVAL MOD COMPLEX 30 MIN: CPT | Performed by: PHYSICAL THERAPIST

## 2020-12-17 PROCEDURE — 97140 MANUAL THERAPY 1/> REGIONS: CPT | Performed by: PHYSICAL THERAPIST

## 2020-12-17 PROCEDURE — 97035 APP MDLTY 1+ULTRASOUND EA 15: CPT | Performed by: PHYSICAL THERAPIST

## 2020-12-17 NOTE — PROGRESS NOTES
Physical Therapy Initial Evaluation and Plan of Care    Patient: Aure Bernard   : 1970  Diagnosis/ICD-10 Code:  Acute pain of right shoulder [M25.511]  Referring practitioner: Alia Roe MD  Date of Initial Visit: 2020  Today's Date: 2020  Patient seen for 1 sessions           Subjective Questionnaire: QuickDASH: 11% impairment      Subjective Evaluation    History of Present Illness  Mechanism of injury: Pt with c/o upper lateral R arm pain just below shoulder x1 yr and has progressively worsened. States pain is uncomfortable achy feeling. Having pain with washing hair. Difficulty reaching into overhead cabinets. No known injury last year but states she aggravated it last week from moving furniture. Unable to sleep on R side. No increased pain with working at computer. No medicines or injection by MD. X-rays done.      Patient Occupation: Work comp coordinator for [employer] Quality of life: excellent    Pain  Current pain ratin  At best pain ratin  At worst pain ratin  Location: lateral aspect R shoulder  Quality: dull ache  Relieving factors: rest  Aggravating factors: overhead activity, lifting, movement, sleeping and prolonged positioning  Progression: worsening    Hand dominance: right    Diagnostic Tests  X-ray: abnormal (mild AC jt degenerative changes)    Patient Goals  Patient goals for therapy: decreased pain, increased motion, increased strength and independence with ADLs/IADLs             Objective          Postural Observations  Seated posture: fair  Standing posture: fair    Additional Postural Observation Details  Rounded shoulder with B scap abduction, R>L    Palpation     Right Tenderness of the upper trapezius.     Tenderness     Right Shoulder  Tenderness in the supraspinatus tendon.     Cervical/Thoracic Screen   Cervical range of motion within normal limits    Active Range of Motion   Left Shoulder   Normal active range of motion    Right Shoulder   Normal  active range of motion    Additional Active Range of Motion Details  Slow and guarded movement during R shoulder flex and abd.    Passive Range of Motion     Right Shoulder   Normal passive range of motion    Scapular Mobility   Left Shoulder   Scapular mobility: good    Right Shoulder   Scapular mobility: fair    Strength/Myotome Testing     Left Shoulder   Normal muscle strength    Right Shoulder     Planes of Motion   Flexion: 3+   Abduction: 4-   External rotation at 0°: 4-   Internal rotation at 0°: 4     Left Elbow   Normal strength    Right Elbow   Normal strength    Tests     Right Shoulder   Positive empty can and full can.           Assessment & Plan     Assessment  Impairments: abnormal coordination, abnormal muscle tone, abnormal or restricted ROM, activity intolerance, impaired physical strength, lacks appropriate home exercise program and pain with function  Assessment details: Pt is 51 yo female with c/o R shoulder pain that has worsened over the last week. Pt presents with guarded ROM and decreased strength of R shoulder. Positive full can and empty can tests indicating supraspinatus involvement. Pt is having difficulty reaching overhead. Difficulty sleeping. Difficulty carrying heavy objects with R hand. Quick DASH indicates 11% impairment.    Patient presents with the impairments listed above and based on the objective findings and the physical therapy evaluation, the patient’s condition has the potential to improve in response to therapy.   The patient’s condition and/or services required are at a level of complexity that necessitates the skill & supervision of a physical therapist.    Prognosis: good  Functional Limitations: carrying objects, lifting, sleeping, pulling, pushing, uncomfortable because of pain, reaching overhead and unable to perform repetitive tasks  Goals  Plan Goals: STG:  - Pt to perform R shoulder flex AROM with good biomechanic in 3 weeks.  - Pt to demonstrated improved  posture with min verbal cues in 3 weeks.  - Pt to report 50% improvement in pain at rest in 3 weeks.  LTG:  - Improve Quick DASH to 5% or less impairment by discharge.  - Increase R shoulder flex strength to 4+/5 deg for improved over head use of R UE by discharge.  - ADLs to be returned to PLOF by discharge.  - Pt to be independent with HEP by discharge.    Plan  Therapy options: will be seen for skilled physical therapy services  Planned modality interventions: electrical stimulation/Russian stimulation, thermotherapy (hydrocollator packs) and ultrasound  Other planned modality interventions: modalities as indicated  Planned therapy interventions: manual therapy, body mechanics training, flexibility, functional ROM exercises, home exercise program, joint mobilization, postural training, soft tissue mobilization, spinal/joint mobilization, strengthening, stretching and therapeutic activities  Frequency: 1x week  Duration in visits: 12  Treatment plan discussed with: patient        Timed:         Manual Therapy:    15     mins  28814;     Therapeutic Exercise:    5     mins  25753;     Neuromuscular Abby:        mins  24109;    Therapeutic Activity:          mins  76562;     Gait Training:           mins  36167;     Ultrasound:     8     mins  48156;    Ionto                                   mins   43199  Can Repos          mins 69040    Un-Timed:  Electrical Stimulation:         mins  45957 ( );  Dry Needling          mins self-pay  Traction          mins 47099  Low Eval          Mins  84506  Mod Eval     25     Mins  98811  High Eval                            Mins  95136  Self - Care                          mins  21662        Timed Treatment:   28   mins   Total Treatment:     53   mins    PT SIGNATURE: Karen Goddard, PT   DATE TREATMENT INITIATED: 12/17/2020    Initial Certification  Certification Period: 3/17/2021  I certify that the therapy services are furnished while this patient is under my  care.  The services outlined above are required by this patient, and will be reviewed every 90 days.     PHYSICIAN: Alia Roe, MD  _______________________________________________________________________________    DATE:  _________________________________________________    Please sign and return via fax to [phone].. Thank you, [facility].

## 2020-12-18 ENCOUNTER — TELEPHONE (OUTPATIENT)
Dept: FAMILY MEDICINE CLINIC | Facility: CLINIC | Age: 50
End: 2020-12-18

## 2020-12-18 NOTE — TELEPHONE ENCOUNTER
Called pt and told her results, and to start taking coq10 and omega 3, and that we would recheck in three months.

## 2020-12-18 NOTE — TELEPHONE ENCOUNTER
----- Message from Alia Roe MD sent at 12/18/2020 11:04 AM EST -----  Her xray shows arthritis in the shoulder. Her cholesterol is really up. Her LDL is up to 142. One liver enzyme is up. It may be time to think about medication. Ask about her diet and ask about omega 3 and co q 10. Is she taking them?

## 2020-12-21 ENCOUNTER — TREATMENT (OUTPATIENT)
Dept: PHYSICAL THERAPY | Facility: CLINIC | Age: 50
End: 2020-12-21

## 2020-12-21 DIAGNOSIS — M25.511 ACUTE PAIN OF RIGHT SHOULDER: Primary | ICD-10-CM

## 2020-12-21 PROCEDURE — 97035 APP MDLTY 1+ULTRASOUND EA 15: CPT | Performed by: PHYSICAL THERAPIST

## 2020-12-21 PROCEDURE — 97140 MANUAL THERAPY 1/> REGIONS: CPT | Performed by: PHYSICAL THERAPIST

## 2020-12-21 PROCEDURE — 97110 THERAPEUTIC EXERCISES: CPT | Performed by: PHYSICAL THERAPIST

## 2020-12-21 NOTE — PROGRESS NOTES
Physical Therapy Daily Progress Note      Patient: Aure Bernard   : 1970  Diagnosis/ICD-10 Code:  Acute pain of right shoulder [M25.511]   Problems Addressed this Visit        Nervous and Auditory    Acute pain of right shoulder - Primary      Diagnoses       Codes Comments    Acute pain of right shoulder    -  Primary ICD-10-CM: M25.511  ICD-9-CM: 719.41         Referring practitioner: Alia Roe MD  Date of Initial Visit: Type: THERAPY  Noted: 2020  Today's Date: 2020    VISIT#: 2    Subjective : Pt reports having some R elbow pain while making candy over the weekend. Shoulder is feeling better. 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    Objective : Added prone rows and ext, mid rows and lat pull-downs, and NuStep to exercises this date.    See Exercise, Manual, and Modality Logs for complete treatment.     Assessment/Plan : Decreased shoulder pain at start of session. Good tolerance to progression of exercises with no increase in symptoms. Pt will benefit from continued scap strengthening and R shoulder strengthening to improve functional use of R UE.    Progress per Plan of Care         Timed:         Manual Therapy:    10     mins  33950;     Therapeutic Exercise:    15     mins  16001;     Neuromuscular Abby:        mins  30596;    Therapeutic Activity:          mins  77774;     Gait Training:           mins  66149;     Ultrasound:    8      mins  98011;    Ionto                                   mins   65233  Self Care                            mins   39557  Canalith Repos                   mins  69051    Un-Timed:  Electrical Stimulation:         mins  19033 ( );  Dry Needling          mins self-pay  Traction          mins 89151  Low Eval          Mins  57236  Mod Eval          Mins  48817  High Eval                            Mins  99060  Re-Eval                               mins   52350    Timed Treatment:   33   mins   Total Treatment:     33   mins    Karen Goddard, PT  Physical Therapist

## 2020-12-24 ENCOUNTER — TELEPHONE (OUTPATIENT)
Dept: FAMILY MEDICINE CLINIC | Facility: CLINIC | Age: 50
End: 2020-12-24

## 2020-12-24 NOTE — TELEPHONE ENCOUNTER
----- Message from Alia Roe MD sent at 12/24/2020 10:36 AM EST -----  Start co q 10 100mg and omega 3 1000mg. Eat more tuna, salmon and walnuts. Recheck 3 months  Her cholesterol is up. Add flax seed oil. Hormones show menopause

## 2020-12-29 ENCOUNTER — TREATMENT (OUTPATIENT)
Dept: PHYSICAL THERAPY | Facility: CLINIC | Age: 50
End: 2020-12-29

## 2020-12-29 DIAGNOSIS — M25.511 ACUTE PAIN OF RIGHT SHOULDER: Primary | ICD-10-CM

## 2020-12-29 PROCEDURE — 97140 MANUAL THERAPY 1/> REGIONS: CPT | Performed by: PHYSICAL THERAPIST

## 2020-12-29 PROCEDURE — 97110 THERAPEUTIC EXERCISES: CPT | Performed by: PHYSICAL THERAPIST

## 2020-12-29 NOTE — PROGRESS NOTES
Physical Therapy Daily Progress Note      Patient: Aure Bernard   : 1970  Diagnosis/ICD-10 Code:  Acute pain of right shoulder [M25.511]   Problems Addressed this Visit        Other    Acute pain of right shoulder - Primary      Diagnoses       Codes Comments    Acute pain of right shoulder    -  Primary ICD-10-CM: M25.511  ICD-9-CM: 719.41         Referring practitioner: Alia oRe MD  Date of Initial Visit: Type: THERAPY  Noted: 2020  Today's Date: 2020    VISIT#: 3    Subjective : Pt reports having no shoulder pain today and that elbow pain has resolved that was present at last visit. States she had her 1 yo granddaughter all day yesterday but did not attempt to pick her up.    Objective : US held today due to no shoulder pain. Added mid rows, resisted B ER, and supine press up and flexion today.    See Exercise, Manual, and Modality Logs for complete treatment.     Assessment/Plan : Decreased R shoulder pain at start of session. Good tolerance to progression of ther ex with no pain. Will benefit from continued scap and shoulder strengthening.    Progress per Plan of Care         Timed:         Manual Therapy:    10     mins  89455;     Therapeutic Exercise:    20     mins  80093;     Neuromuscular Abby:        mins  86133;    Therapeutic Activity:          mins  90984;     Gait Training:           mins  18764;     Ultrasound:          mins  80150;    Ionto                                   mins   20128  Self Care                            mins   97341  Canalith Repos                   mins  74976    Un-Timed:  Electrical Stimulation:         mins  37343 ( );  Dry Needling          mins self-pay  Traction          mins 29476  Low Eval          Mins  72381  Mod Eval          Mins  35410  High Eval                            Mins  29862  Re-Eval                               mins  70292    Timed Treatment:   30   mins   Total Treatment:     30   mins    Karen Goddard  PT  Physical Therapist

## 2021-01-04 ENCOUNTER — HOSPITAL ENCOUNTER (OUTPATIENT)
Dept: MAMMOGRAPHY | Facility: HOSPITAL | Age: 51
End: 2021-01-04

## 2021-01-04 ENCOUNTER — TREATMENT (OUTPATIENT)
Dept: PHYSICAL THERAPY | Facility: CLINIC | Age: 51
End: 2021-01-04

## 2021-01-04 DIAGNOSIS — M25.511 ACUTE PAIN OF RIGHT SHOULDER: Primary | ICD-10-CM

## 2021-01-04 PROCEDURE — 97110 THERAPEUTIC EXERCISES: CPT | Performed by: PHYSICAL THERAPIST

## 2021-01-04 PROCEDURE — 97140 MANUAL THERAPY 1/> REGIONS: CPT | Performed by: PHYSICAL THERAPIST

## 2021-01-04 NOTE — PROGRESS NOTES
Physical Therapy Daily Progress Note      Patient: Aure Bernard   : 1970  Diagnosis/ICD-10 Code:  Acute pain of right shoulder [M25.511]   Problems Addressed this Visit        Other    Acute pain of right shoulder - Primary      Diagnoses       Codes Comments    Acute pain of right shoulder    -  Primary ICD-10-CM: M25.511  ICD-9-CM: 719.41          Referring practitioner: Alia Roe MD  Date of Initial Visit: Type: THERAPY  Noted: 2020  Today's Date: 2021    VISIT#: 4    Subjective Patient reports shoulder is feeling much better, currently occasionally getting some aching in R elbow from time to time.       Objective     See Exercise, Manual, and Modality Logs for complete treatment.     Assessment/Plan Patient tolerated all performed therapeutic exercise well with no reports of increased symptoms. Will continue to benefit from base scapular and shoulder strengthening.     Progress per Plan of Care         Timed:         Manual Therapy:    15     mins  60961;     Therapeutic Exercise:    20     mins  46850;     Neuromuscular Abby:        mins  03664;    Therapeutic Activity:          mins  99142;     Gait Training:           mins  92584;     Ultrasound:         mins  56993;    Ionto                                   mins   85478  Canalith Repos                   mins  59344    Un-Timed:  Electrical Stimulation:         mins  37587 (MC );  Dry Needling          mins self-pay  Traction          mins 68905    Timed Treatment:   35   mins   Total Treatment:     35   mins    Jt Patel PTA  Physical Therapist

## 2021-01-11 ENCOUNTER — HOSPITAL ENCOUNTER (OUTPATIENT)
Dept: MAMMOGRAPHY | Facility: HOSPITAL | Age: 51
Discharge: HOME | End: 2021-01-11
Admitting: FAMILY MEDICINE

## 2021-01-11 PROCEDURE — 77063 BREAST TOMOSYNTHESIS BI: CPT

## 2021-01-11 PROCEDURE — 77067 SCR MAMMO BI INCL CAD: CPT

## 2021-01-14 ENCOUNTER — TREATMENT (OUTPATIENT)
Dept: PHYSICAL THERAPY | Facility: CLINIC | Age: 51
End: 2021-01-14

## 2021-01-14 DIAGNOSIS — M25.511 ACUTE PAIN OF RIGHT SHOULDER: Primary | ICD-10-CM

## 2021-01-14 PROCEDURE — 97110 THERAPEUTIC EXERCISES: CPT | Performed by: PHYSICAL THERAPIST

## 2021-01-14 PROCEDURE — 97140 MANUAL THERAPY 1/> REGIONS: CPT | Performed by: PHYSICAL THERAPIST

## 2021-01-21 ENCOUNTER — TREATMENT (OUTPATIENT)
Dept: PHYSICAL THERAPY | Facility: CLINIC | Age: 51
End: 2021-01-21

## 2021-01-21 DIAGNOSIS — M25.511 ACUTE PAIN OF RIGHT SHOULDER: Primary | ICD-10-CM

## 2021-01-21 PROCEDURE — 97110 THERAPEUTIC EXERCISES: CPT | Performed by: PHYSICAL THERAPIST

## 2021-01-21 PROCEDURE — 97035 APP MDLTY 1+ULTRASOUND EA 15: CPT | Performed by: PHYSICAL THERAPIST

## 2021-01-21 NOTE — PROGRESS NOTES
Physical Therapy Daily Progress Note      Patient: Aure Bernard   : 1970  Diagnosis/ICD-10 Code:  Acute pain of right shoulder [M25.511]   Problems Addressed this Visit        Other    Acute pain of right shoulder - Primary      Diagnoses       Codes Comments    Acute pain of right shoulder    -  Primary ICD-10-CM: M25.511  ICD-9-CM: 719.41         Referring practitioner: Alia Roe MD  Date of Initial Visit: Type: THERAPY  Noted: 2020  Today's Date: 2021    VISIT#: 6    Subjective : Pt reports waking up this morning with R shoulder hurting and has been having pain all day. Rates current pain at 3/10. Difficulty opening office door, which is heavy, at work today and had to use L hand. Reports continued R elbow pain that is not improving.      Objective : resumed US to R supraspinatus.  Decreased wt on supine press-up and flexion this date due to increased pain.  See Exercise, Manual, and Modality Logs for complete treatment.     Assessment/Plan : Increased pain at start of session that started this morning. Able to complete ther ex with no increase in symptoms. PT contacted MD regarding c/o R elbow pain and requested order to eval if MD in agreement. Needs continued strengthening and use of modalities to decrease R shoulder pain and improve functional use.    Progress per Plan of Care         Timed:         Manual Therapy:         mins  49820;     Therapeutic Exercise:    20     mins  03017;     Neuromuscular Abby:        mins  36701;    Therapeutic Activity:          mins  90818;     Gait Training:           mins  65789;     Ultrasound:     8     mins  18555;    Ionto                                   mins   90310  Self Care                            mins   58039  Canalith Repos                   mins  66138    Un-Timed:  Electrical Stimulation:         mins  64544 ( );  Dry Needling          mins self-pay  Traction          mins 38276  Low Eval          Mins  28383  Mod Eval           Mins  65308  High Eval                            Mins  78519  Re-Eval                               mins  33331    Timed Treatment:   28   mins   Total Treatment:     28   mins    Karen Goddard, PT  Physical Therapist

## 2021-01-22 DIAGNOSIS — M25.521 RIGHT ELBOW PAIN: Primary | ICD-10-CM

## 2021-01-28 ENCOUNTER — TREATMENT (OUTPATIENT)
Dept: PHYSICAL THERAPY | Facility: CLINIC | Age: 51
End: 2021-01-28

## 2021-01-28 DIAGNOSIS — M25.521 RIGHT ELBOW PAIN: Primary | ICD-10-CM

## 2021-01-28 DIAGNOSIS — M25.511 ACUTE PAIN OF RIGHT SHOULDER: ICD-10-CM

## 2021-01-28 PROCEDURE — 97164 PT RE-EVAL EST PLAN CARE: CPT | Performed by: PHYSICAL THERAPIST

## 2021-01-28 PROCEDURE — 97140 MANUAL THERAPY 1/> REGIONS: CPT | Performed by: PHYSICAL THERAPIST

## 2021-01-28 PROCEDURE — 97035 APP MDLTY 1+ULTRASOUND EA 15: CPT | Performed by: PHYSICAL THERAPIST

## 2021-01-28 NOTE — PROGRESS NOTES
Re-Assessment / Re-Certification        Patient: Aure Bernard   : 1970  Diagnosis/ICD-10 Code:  Right elbow pain [M25.521]  Referring practitioner: Alia Roe MD  Date of Initial Visit: Type: THERAPY  Noted: 2020  Today's Date: 2021  Patient seen for 7 sessions      Subjective:   Aure Bernard reports: continued R elbow pain. States her shoulder is feeling better. R elbow pain has been present for 3.5 wk. Pain was gradual onset and increases with activity. Describes pain as an ache. Pt is R hand dominate. Difficulty vacuuming at home with a light cordless vacuum due to pain with pushing it. Difficulty opening tight jars. Difficulty with opening her office door which is heavy. Increased pain with working at her computer. Difficulty lifting coffee pot.  Subjective Questionnaire: QuickDASH: 23% (was 11% at al 2020)  Clinical Progress: improved  Home Program Compliance: Yes  Treatment has included: therapeutic exercise, manual therapy, therapeutic activity, electrical stimulation, ultrasound and moist heat    Objective          Palpation     Additional Palpation Details  No tenderness to palpation.    Active Range of Motion   Left Shoulder   Normal active range of motion    Right Shoulder   Normal active range of motion    Left Elbow   Normal active range of motion    Right Elbow   Normal active range of motion    Right Wrist   Normal active range of motion    Strength/Myotome Testing     Right Shoulder     Planes of Motion   Flexion: 4-   Abduction: 4-   External rotation at 0°: 4-   Internal rotation at 0°: 4     Right Elbow   Flexion: 5  Extension: 5    Left Wrist/Hand   Wrist extension: 5  Wrist flexion: 5  Radial deviation: 5  Ulnar deviation: 5     (2nd hand position)     Trial 1: 40 lbs    Trial 2: 40 lbs    Trial 3: 40 lbs    Average: 40 lbs    Right Wrist/Hand   Wrist extension: 4  Wrist flexion: 4  Radial deviation: 4+  Ulnar deviation: 4+     (2nd hand position)     Trial  1: 30 lbs    Trial 2: 25 lbs    Trial 3: 25 lbs    Average: 26.67 lbs      Assessment & Plan     Assessment  Impairments: abnormal muscle tone, activity intolerance, impaired physical strength, lacks appropriate home exercise program and pain with function  Assessment details: Pt is 50 female with 3.5 wk reports of R elbow pain of gradual onset. Pt is having tenderness at R brachioradialis with increased tension noted. Decreased R elbow and wrist strength and decreased R  strength compared to L. Pt is having difficulty with working at her computer. Difficulty vacuuming and performing household tasks. Difficulty opening her office door. Continued R shoulder weakness but with decreased R shoulder pain. Quick DASH indicates 23% impairment.    Patient presents with the impairments listed above and based on the objective findings and the physical therapy evaluation, the patient’s condition has the potential to improve in response to therapy.   The patient’s condition and/or services required are at a level of complexity that necessitates the skill & supervision of a physical therapist.    Prognosis: good  Functional Limitations: carrying objects, lifting, pulling, pushing, uncomfortable because of pain, reaching overhead and unable to perform repetitive tasks  Goals  Plan Goals: STG:  - Pt to reports 50% or better improvement in R elbow pain with working at the computer in 3 weeks.  - Pt to report continued no R shoulder pain in 2 weeks.  - Pt to be independent with HEP in 3 weeks.  LTG:  - Improve Quick DASH to 5% or less impairment by discharge.  - Increase R shoulder flex strength to 4+/5 deg for improved over head use of R UE by discharge.  - Pt to report no elbow pain with vacuuming by discharge.  - Increase R  strength to 40# by discharge.    Plan  Therapy options: will be seen for skilled physical therapy services  Planned modality interventions: dry needling, thermotherapy (hydrocollator packs),  ultrasound and electrical stimulation/Russian stimulation  Other planned modality interventions: modalities as indicated  Planned therapy interventions: manual therapy, postural training, soft tissue mobilization, flexibility, body mechanics training, ADL retraining, functional ROM exercises, home exercise program, joint mobilization, strengthening, stretching and therapeutic activities  Frequency: 2x week  Treatment plan discussed with: patient      Progress toward previous goals: Partially Met        Recommendations: Continue as planned  Timeframe: up to a total of 20 visits if needed  Prognosis to achieve goals: good    PT Signature: Karen Goddard, PT      Based upon review of the patient's progress and continued therapy plan, it is my medical opinion that Aure Bernard should continue physical therapy treatment at [facility]  [address] [place] [address] DR YELITZA DEMPSEY [facility]  [place] [address] [phone].    Signature: __________________________________  Alia Roe MD    Timed:         Manual Therapy:    15     mins  93231;     Therapeutic Exercise:    5     mins  14762;     Neuromuscular Abby:        mins  45074;    Therapeutic Activity:          mins  86369;     Gait Training:           mins  05765;     Ultrasound:    8      mins  16224;    Ionto                                   mins   98936  Self Care                            mins   20417  Canalith Repos                   mins  4209    Un-Timed:  Electrical Stimulation:         mins  49006 ( );  Dry Needling          mins self-pay  Traction          mins 60537  Low Eval          Mins  25842  Mod Eval          Mins  70478  High Eval                            Mins  12737  Re-Eval                           15    mins  59442      Timed Treatment:   28   mins   Total Treatment:     43   mins

## 2021-02-04 ENCOUNTER — TREATMENT (OUTPATIENT)
Dept: PHYSICAL THERAPY | Facility: CLINIC | Age: 51
End: 2021-02-04

## 2021-02-04 DIAGNOSIS — M25.521 RIGHT ELBOW PAIN: Primary | ICD-10-CM

## 2021-02-04 DIAGNOSIS — M25.511 ACUTE PAIN OF RIGHT SHOULDER: ICD-10-CM

## 2021-02-04 PROCEDURE — 97140 MANUAL THERAPY 1/> REGIONS: CPT | Performed by: PHYSICAL THERAPIST

## 2021-02-04 PROCEDURE — 97035 APP MDLTY 1+ULTRASOUND EA 15: CPT | Performed by: PHYSICAL THERAPIST

## 2021-02-04 PROCEDURE — 97110 THERAPEUTIC EXERCISES: CPT | Performed by: PHYSICAL THERAPIST

## 2021-02-04 NOTE — PROGRESS NOTES
Physical Therapy Daily Progress Note      Patient: Aure Bernard   : 1970  Diagnosis/ICD-10 Code:  Right elbow pain [M25.521]   Problems Addressed this Visit        Other    Acute pain of right shoulder      Other Visit Diagnoses     Right elbow pain    -  Primary      Diagnoses       Codes Comments    Right elbow pain    -  Primary ICD-10-CM: M25.521  ICD-9-CM: 719.42     Acute pain of right shoulder     ICD-10-CM: M25.511  ICD-9-CM: 719.41         Referring practitioner: Alia Roe MD  Date of Initial Visit: Type: THERAPY  Noted: 2020  Today's Date: 2021    VISIT#: 8    Subjective : Pt reports her shoulder is feeling good. States elbow is still sore but not as bad. Thinks US helped last visit.    Objective :  See Exercise, Manual, and Modality Logs for complete treatment.     Assessment/Plan : Decreased pain at start of session. Good response to US with decreased tenderness at R brachioradialis. Pt will benefit from continued shoulder, elbow, and wrist strengthening to decrease pain.    Progress per Plan of Care         Timed:         Manual Therapy:    15     mins  60471;     Therapeutic Exercise:    15     mins  19128;     Neuromuscular Abby:        mins  36725;    Therapeutic Activity:          mins  50528;     Gait Training:           mins  33214;     Ultrasound:     8     mins  47186;    Ionto                                   mins   27622  Self Care                            mins   68023  Canalith Repos                   mins  30847    Un-Timed:  Electrical Stimulation:         mins  28221 ( );  Dry Needling          mins self-pay  Traction          mins 52848  Low Eval          Mins  48286  Mod Eval          Mins  32629  High Eval                            Mins  33191  Re-Eval                               mins  42814    Timed Treatment:   38   mins   Total Treatment:     38   mins    Karen Goddard, SANTI  Physical Therapist

## 2021-03-30 ENCOUNTER — OFFICE VISIT (OUTPATIENT)
Dept: FAMILY MEDICINE CLINIC | Facility: CLINIC | Age: 51
End: 2021-03-30

## 2021-03-30 VITALS
WEIGHT: 143 LBS | DIASTOLIC BLOOD PRESSURE: 76 MMHG | TEMPERATURE: 98 F | HEIGHT: 61 IN | BODY MASS INDEX: 27 KG/M2 | SYSTOLIC BLOOD PRESSURE: 110 MMHG | OXYGEN SATURATION: 97 % | RESPIRATION RATE: 18 BRPM | HEART RATE: 84 BPM

## 2021-03-30 DIAGNOSIS — E78.2 MIXED HYPERLIPIDEMIA: Primary | ICD-10-CM

## 2021-03-30 DIAGNOSIS — M25.511 ACUTE PAIN OF RIGHT SHOULDER: ICD-10-CM

## 2021-03-30 DIAGNOSIS — M24.821 RIGHT ELBOW JOINT CREPITUS: ICD-10-CM

## 2021-03-30 DIAGNOSIS — G44.209 TENSION HEADACHE: ICD-10-CM

## 2021-03-30 PROCEDURE — 99213 OFFICE O/P EST LOW 20 MIN: CPT | Performed by: FAMILY MEDICINE

## 2021-04-01 LAB
ALBUMIN SERPL-MCNC: 4.5 G/DL (ref 3.8–4.8)
ALBUMIN/GLOB SERPL: 1.8 {RATIO} (ref 1.2–2.2)
ALP SERPL-CCNC: 76 IU/L (ref 39–117)
ALT SERPL-CCNC: 52 IU/L (ref 0–32)
AST SERPL-CCNC: 21 IU/L (ref 0–40)
BILIRUB SERPL-MCNC: 0.5 MG/DL (ref 0–1.2)
BUN SERPL-MCNC: 17 MG/DL (ref 6–24)
BUN/CREAT SERPL: 20 (ref 9–23)
CALCIUM SERPL-MCNC: 9.3 MG/DL (ref 8.7–10.2)
CHLORIDE SERPL-SCNC: 104 MMOL/L (ref 96–106)
CHOLEST SERPL-MCNC: 234 MG/DL (ref 100–199)
CHOLEST/HDLC SERPL: 4 RATIO (ref 0–4.4)
CO2 SERPL-SCNC: 24 MMOL/L (ref 20–29)
CREAT SERPL-MCNC: 0.86 MG/DL (ref 0.57–1)
GLOBULIN SER CALC-MCNC: 2.5 G/DL (ref 1.5–4.5)
GLUCOSE SERPL-MCNC: 105 MG/DL (ref 65–99)
HDLC SERPL-MCNC: 59 MG/DL
LDLC SERPL CALC-MCNC: 153 MG/DL (ref 0–99)
POTASSIUM SERPL-SCNC: 4.3 MMOL/L (ref 3.5–5.2)
PROT SERPL-MCNC: 7 G/DL (ref 6–8.5)
SODIUM SERPL-SCNC: 142 MMOL/L (ref 134–144)
TRIGL SERPL-MCNC: 122 MG/DL (ref 0–149)
VLDLC SERPL CALC-MCNC: 22 MG/DL (ref 5–40)

## 2021-04-02 ENCOUNTER — TELEPHONE (OUTPATIENT)
Dept: FAMILY MEDICINE CLINIC | Facility: CLINIC | Age: 51
End: 2021-04-02

## 2021-04-02 DIAGNOSIS — E78.2 MIXED HYPERLIPIDEMIA: Primary | ICD-10-CM

## 2021-04-02 NOTE — TELEPHONE ENCOUNTER
----- Message from Alia Roe MD sent at 4/2/2021  5:04 PM EDT -----  Her cholesterol is elevated and one liver enzyme also. I know she is working on diet. I will give her 3 months to keep working on it.    Tam Cohen MD

## 2021-04-02 NOTE — TELEPHONE ENCOUNTER
----- Message from Alia Roe MD sent at 4/2/2021  5:04 PM EDT -----  Her cholesterol is elevated and one liver enzyme also. I know she is working on diet. I will give her 3 months to keep working on it.

## 2021-06-05 LAB
ALBUMIN SERPL-MCNC: 4.9 G/DL (ref 3.8–4.8)
ALBUMIN/GLOB SERPL: 2 {RATIO} (ref 1.2–2.2)
ALP SERPL-CCNC: 97 IU/L (ref 48–121)
ALT SERPL-CCNC: 63 IU/L (ref 0–32)
AST SERPL-CCNC: 32 IU/L (ref 0–40)
BILIRUB SERPL-MCNC: 0.4 MG/DL (ref 0–1.2)
BUN SERPL-MCNC: 18 MG/DL (ref 6–24)
BUN/CREAT SERPL: 21 (ref 9–23)
CALCIUM SERPL-MCNC: 9.4 MG/DL (ref 8.7–10.2)
CHLORIDE SERPL-SCNC: 102 MMOL/L (ref 96–106)
CHOLEST SERPL-MCNC: 245 MG/DL (ref 100–199)
CHOLEST/HDLC SERPL: 4 RATIO (ref 0–4.4)
CO2 SERPL-SCNC: 23 MMOL/L (ref 20–29)
CREAT SERPL-MCNC: 0.85 MG/DL (ref 0.57–1)
GLOBULIN SER CALC-MCNC: 2.4 G/DL (ref 1.5–4.5)
GLUCOSE SERPL-MCNC: 103 MG/DL (ref 65–99)
HDLC SERPL-MCNC: 61 MG/DL
LDLC SERPL CALC-MCNC: 158 MG/DL (ref 0–99)
POTASSIUM SERPL-SCNC: 4.7 MMOL/L (ref 3.5–5.2)
PROT SERPL-MCNC: 7.3 G/DL (ref 6–8.5)
SODIUM SERPL-SCNC: 141 MMOL/L (ref 134–144)
TRIGL SERPL-MCNC: 146 MG/DL (ref 0–149)
VLDLC SERPL CALC-MCNC: 26 MG/DL (ref 5–40)

## 2021-06-08 ENCOUNTER — TELEPHONE (OUTPATIENT)
Dept: FAMILY MEDICINE CLINIC | Facility: CLINIC | Age: 51
End: 2021-06-08

## 2021-06-24 ENCOUNTER — HOSPITAL ENCOUNTER (OUTPATIENT)
Dept: GENERAL RADIOLOGY | Facility: HOSPITAL | Age: 51
Discharge: HOME | End: 2021-06-24

## 2021-06-24 ENCOUNTER — OFFICE VISIT (OUTPATIENT)
Dept: FAMILY MEDICINE CLINIC | Facility: CLINIC | Age: 51
End: 2021-06-24

## 2021-06-24 VITALS
TEMPERATURE: 98 F | SYSTOLIC BLOOD PRESSURE: 118 MMHG | OXYGEN SATURATION: 99 % | RESPIRATION RATE: 18 BRPM | DIASTOLIC BLOOD PRESSURE: 78 MMHG | HEIGHT: 61 IN | WEIGHT: 148.6 LBS | HEART RATE: 89 BPM | BODY MASS INDEX: 28.05 KG/M2

## 2021-06-24 DIAGNOSIS — M25.50 POLYARTHRALGIA: ICD-10-CM

## 2021-06-24 DIAGNOSIS — M54.50 ACUTE BILATERAL LOW BACK PAIN WITHOUT SCIATICA: ICD-10-CM

## 2021-06-24 DIAGNOSIS — M25.562 ACUTE PAIN OF BOTH KNEES: Primary | ICD-10-CM

## 2021-06-24 DIAGNOSIS — M25.561 ACUTE PAIN OF BOTH KNEES: Primary | ICD-10-CM

## 2021-06-24 PROCEDURE — 72100 X-RAY EXAM L-S SPINE 2/3 VWS: CPT

## 2021-06-24 PROCEDURE — 73565 X-RAY EXAM OF KNEES: CPT

## 2021-06-24 PROCEDURE — 99214 OFFICE O/P EST MOD 30 MIN: CPT | Performed by: FAMILY MEDICINE

## 2021-06-24 RX ORDER — NAPROXEN 500 MG/1
500 TABLET ORAL 2 TIMES DAILY PRN
Qty: 60 TABLET | Refills: 1 | Status: SHIPPED | OUTPATIENT
Start: 2021-06-24 | End: 2022-06-14

## 2021-06-24 RX ORDER — CHLORAL HYDRATE 500 MG
CAPSULE ORAL
COMMUNITY

## 2021-06-24 NOTE — PROGRESS NOTES
Subjective   Aure Bernard is a 51 y.o. female.     Chief Complaint   Patient presents with   • Leg Pain       Leg Pain   The incident occurred 5 to 7 days ago. There was no injury mechanism. The pain is present in the left leg, right leg, left knee, left ankle, right ankle and right knee. The quality of the pain is described as aching and shooting. The pain is moderate. The pain has been intermittent since onset. Associated symptoms include an inability to bear weight. Pertinent negatives include no numbness or tingling. She reports no foreign bodies present. The symptoms are aggravated by movement and weight bearing. She has tried elevation for the symptoms. The treatment provided mild relief.    She is achy in her knees, ankles. Her right elbow is also hurting. Started after shopping all day. Pain is mostly in her knees. When she walks her ankles bother her.     I personally reviewed and updated the patient's allergies, medications, problem list, and past medical, surgical, social, and family history. I have reviewed and confirmed the accuracy of the History of Present Illness and Review of Symptoms as documented by the MA/LPN/RN. Alia Roe MD    Allergies:  No Known Allergies    Social History:  Social History     Socioeconomic History   • Marital status:      Spouse name: Not on file   • Number of children: Not on file   • Years of education: Not on file   • Highest education level: Not on file   Tobacco Use   • Smoking status: Former Smoker     Types: Cigarettes   • Smokeless tobacco: Never Used   • Tobacco comment: quit [id number]   Vaping Use   • Vaping Use: Never used   Substance and Sexual Activity   • Alcohol use: Not Currently   • Drug use: No   • Sexual activity: Defer       Family History:  Family History   Problem Relation Age of Onset   • Colon cancer Maternal Grandfather    • Breast cancer Neg Hx    • Ovarian cancer Neg Hx        Past Medical History :  Patient Active Problem List    Diagnosis   • Acute bilateral low back pain without sciatica   • Allergic rhinitis due to pollen   • Anxiety   • Convulsions (CMS/HCC)   • Daytime somnolence   • Dense breast   • Gastroesophageal reflux disease   • Irritable colon   • Overweight (BMI 25.0-29.9)   • Encounter for screening mammogram for malignant neoplasm of breast   • Encounter for annual general medical examination with abnormal findings in adult   • History of tobacco use   • H/O: hysterectomy   • Acute pain of right shoulder   • Screening mammogram, encounter for   • Menopausal symptom   • Mixed hyperlipidemia   • Right elbow joint crepitus   • Tension headache   • Acute pain of both knees   • Polyarthralgia       Medication List:    Current Outpatient Medications:   •  fexofenadine (ALLEGRA) 180 MG tablet, Take  by mouth Daily., Disp: , Rfl:   •  Omega-3 Fatty Acids (fish oil) 1000 MG capsule capsule, Take  by mouth Daily With Breakfast., Disp: , Rfl:   •  Omeprazole 20 MG Tablet Delayed Release Dispersible, Take  by mouth Daily., Disp: , Rfl:   •  polyethylene glycol (MIRALAX) powder, Take  by mouth Daily., Disp: , Rfl:   •  naproxen (NAPROSYN) 500 MG tablet, Take 1 tablet by mouth 2 (Two) Times a Day As Needed for Mild Pain ., Disp: 60 tablet, Rfl: 1    Past Surgical History:  Past Surgical History:   Procedure Laterality Date   • HYSTERECTOMY         Review of Systems:  Review of Systems   Constitutional: Negative for activity change and fever.   HENT: Negative for ear pain, rhinorrhea, sinus pressure and voice change.    Eyes: Negative for visual disturbance.   Respiratory: Negative for cough and shortness of breath.    Cardiovascular: Negative for chest pain.   Gastrointestinal: Negative for abdominal pain, diarrhea, nausea and vomiting.   Endocrine: Negative for cold intolerance and heat intolerance.   Genitourinary: Negative for frequency and urgency.   Musculoskeletal: Negative for arthralgias.   Skin: Negative for rash.  "  Neurological: Negative for tingling, syncope and numbness.   Hematological: Does not bruise/bleed easily.   Psychiatric/Behavioral: Negative for depressed mood. The patient is not nervous/anxious.        Physical Exam:  Vital Signs:  Vital Signs:   /78 (BP Location: Right arm, Patient Position: Sitting, Cuff Size: Adult)   Pulse 89   Temp 98 °F (36.7 °C)   Resp 18   Ht 154.9 cm (60.98\")   Wt 67.4 kg (148 lb 9.6 oz)   SpO2 99%   BMI 28.09 kg/m²     Result Review :                Physical Exam  Vitals reviewed.   Constitutional:       General: She is not in acute distress.     Appearance: Normal appearance. She is well-developed. She is not diaphoretic.   HENT:      Head: Normocephalic and atraumatic.   Eyes:      General:         Right eye: No discharge.         Left eye: No discharge.   Cardiovascular:      Rate and Rhythm: Normal rate and regular rhythm.      Heart sounds: Normal heart sounds. No murmur heard.   No friction rub. No gallop.    Pulmonary:      Effort: Pulmonary effort is normal. No respiratory distress.      Breath sounds: Normal breath sounds. No wheezing or rales.   Musculoskeletal:         General: No deformity.      Left elbow: Normal. No swelling, deformity, effusion or lacerations. Normal range of motion. No tenderness.      Lumbar back: No deformity, spasms or tenderness. Normal range of motion.      Right knee: No swelling or crepitus. Normal range of motion. No tenderness.      Left knee: No swelling or crepitus. Normal range of motion. No tenderness.   Skin:     General: Skin is warm and dry.      Findings: No rash.   Neurological:      Mental Status: She is alert and oriented to person, place, and time.      Motor: No abnormal muscle tone.      Coordination: Coordination normal.      Gait: Gait normal.      Deep Tendon Reflexes: Reflexes normal.   Psychiatric:         Behavior: Behavior is cooperative.         Assessment and Plan:  Problems Addressed this Visit        " Musculoskeletal and Injuries    Acute bilateral low back pain without sciatica     Ice three times a day for about 10-15 minutes for the first 1-2 days. Then may alternate heat and ice. Better body mechanics discussed. Home exercises discussed and hand out given. Discussed nsaids and if they can be taken. May need imaging and or PT if persists.  Discussed red flags, if there is severe pain, fever with pain, loss of movement in one or both legs pain, numbness in groin or both legs, trouble urinating or defecating on oneself, then patient is to go to the ER.            Relevant Medications    naproxen (NAPROSYN) 500 MG tablet    Other Relevant Orders    XR Spine Lumbar 2 or 3 View (Completed)    Acute pain of both knees - Primary     Will get xray  Will labs and see if there is any rheumatological issues         Relevant Medications    naproxen (NAPROSYN) 500 MG tablet    Other Relevant Orders    XR Knee Bilateral AP Standing (Completed)    Polyarthralgia    Relevant Medications    naproxen (NAPROSYN) 500 MG tablet    Other Relevant Orders    C-reactive Protein    Nuclear Antigen Antibody, IFA    Rheumatoid Factor    Uric Acid    [place] Spotted Fever, IgM    [facility] Spotted Fever, IgG    B. Burgdorferi Antibodies, WB Reflex    Ehrlichia Chaffeensis PCR    Babesia Microti Antibody Panel      Diagnoses       Codes Comments    Acute pain of both knees    -  Primary ICD-10-CM: M25.561, M25.562  ICD-9-CM: 338.19, 719.46     Polyarthralgia     ICD-10-CM: M25.50  ICD-9-CM: 719.49     Acute bilateral low back pain without sciatica     ICD-10-CM: M54.5  ICD-9-CM: 724.2, 338.19            An After Visit Summary and PPPS were given to the patient.         I wore protective equipment throughout this patient encounter to include mask. Hand hygiene was performed before donning protective equipment and after removal when leaving the room.

## 2021-06-28 ENCOUNTER — TELEPHONE (OUTPATIENT)
Dept: FAMILY MEDICINE CLINIC | Facility: CLINIC | Age: 51
End: 2021-06-28

## 2021-06-28 NOTE — TELEPHONE ENCOUNTER
----- Message from Alia Roe MD sent at 6/24/2021  6:10 PM EDT -----  Knees with some narrowing. Will wait on labs.

## 2021-06-30 LAB
ANA TITR SER IF: NEGATIVE {TITER}
B BURGDOR IGG+IGM SER-ACNC: <0.91 ISR (ref 0–0.9)
B BURGDOR IGM SER IA-ACNC: <0.8 INDEX (ref 0–0.79)
B MICROTI IGG TITR SER: NORMAL {TITER}
B MICROTI IGM TITR SER: NORMAL {TITER}
CRP SERPL-MCNC: 2 MG/L (ref 0–10)
E CHAFFEENSIS DNA BLD QL NAA+PROBE: NEGATIVE
R RICKETTSI IGG SER QL IA: NEGATIVE
R RICKETTSI IGM SER-ACNC: 1.28 INDEX (ref 0–0.89)
RHEUMATOID FACT SERPL-ACNC: <10 IU/ML (ref 0–13.9)
URATE SERPL-MCNC: 5.7 MG/DL (ref 2.6–6.2)

## 2021-07-01 ENCOUNTER — TELEPHONE (OUTPATIENT)
Dept: FAMILY MEDICINE CLINIC | Facility: CLINIC | Age: 51
End: 2021-07-01

## 2021-07-01 DIAGNOSIS — A77.0 RMSF (ROCKY MOUNTAIN SPOTTED FEVER): Primary | ICD-10-CM

## 2021-07-01 RX ORDER — DOXYCYCLINE 100 MG/1
100 CAPSULE ORAL 2 TIMES DAILY
Qty: 28 CAPSULE | Refills: 0 | Status: SHIPPED | OUTPATIENT
Start: 2021-07-01 | End: 2021-08-03

## 2021-07-01 NOTE — PROGRESS NOTES
Subjective   Aure Bernard is a 51 y.o. female.     Chief Complaint   Patient presents with   • Leg Pain       Patient does have RMSF     Leg Pain   The incident occurred more than 1 week ago. There was no injury mechanism. The pain is present in the left hip, right hip, right knee, right ankle, left knee and left ankle. The quality of the pain is described as aching. The pain is at a severity of 5/10. The pain has been fluctuating since onset. Associated symptoms include muscle weakness and tingling (some in the feet). Pertinent negatives include no inability to bear weight, loss of motion, loss of sensation or numbness. She reports no foreign bodies present. Exacerbated by: day goes on. Treatments tried: naproxen. The treatment provided moderate relief.      Her labs were negative except for RMSF Igm. Antibiotics started.     Her right sciatic is also bothering her.     I personally reviewed and updated the patient's allergies, medications, problem list, and past medical, surgical, social, and family history. I have reviewed and confirmed the accuracy of the History of Present Illness and Review of Symptoms as documented by the MA/LPN/RN. Alia Roe MD    Allergies:  No Known Allergies    Social History:  Social History     Socioeconomic History   • Marital status:      Spouse name: Not on file   • Number of children: Not on file   • Years of education: Not on file   • Highest education level: Not on file   Tobacco Use   • Smoking status: Former Smoker     Types: Cigarettes   • Smokeless tobacco: Never Used   • Tobacco comment: quit [email or web address]   Vaping Use   • Vaping Use: Never used   Substance and Sexual Activity   • Alcohol use: Not Currently   • Drug use: No   • Sexual activity: Defer       Family History:  Family History   Problem Relation Age of Onset   • Colon cancer Maternal Grandfather    • Breast cancer Neg Hx    • Ovarian cancer Neg Hx        Past Medical History :  Patient Active Problem List    Diagnosis   • Allergic rhinitis due to pollen   • Anxiety   • Convulsions (CMS/HCC)   • Daytime somnolence   • Dense breast   • Gastroesophageal reflux disease   • Irritable colon   • Overweight (BMI 25.0-29.9)   • Encounter for screening mammogram for malignant neoplasm of breast   • Encounter for annual general medical examination with abnormal findings in adult   • History of tobacco use   • H/O: hysterectomy   • Acute pain of right shoulder   • Screening mammogram, encounter for   • Menopausal symptom   • Mixed hyperlipidemia   • Right elbow joint crepitus   • Tension headache   • Acute pain of both knees   • Polyarthralgia   • Right sided sciatica   • RMSF ([place] spotted fever)   • Other insomnia       Medication List:    Current Outpatient Medications:   •  doxycycline (MONODOX) 100 MG capsule, Take 1 capsule by mouth 2 (Two) Times a Day., Disp: 28 capsule, Rfl: 0  •  fexofenadine (ALLEGRA) 180 MG tablet, Take  by mouth Daily., Disp: , Rfl:   •  naproxen (NAPROSYN) 500 MG tablet, Take 1 tablet by mouth 2 (Two) Times a Day As Needed for Mild Pain ., Disp: 60 tablet, Rfl: 1  •  Omega-3 Fatty Acids (fish oil) 1000 MG capsule capsule, Take  by mouth Daily With Breakfast., Disp: , Rfl:   •  Omeprazole 20 MG Tablet Delayed Release Dispersible, Take  by mouth Daily., Disp: , Rfl:   •  polyethylene glycol (MIRALAX) powder, Take  by mouth Daily., Disp: , Rfl:   •  amitriptyline (ELAVIL) 25 MG tablet, Take 1 tablet by mouth Every Night., Disp: 30 tablet, Rfl: 3    Past Surgical History:  Past Surgical History:   Procedure Laterality Date   • HYSTERECTOMY         Review of Systems:  Review of Systems   Constitutional: Negative for activity change and fever.   HENT: Negative for ear pain, rhinorrhea, sinus pressure and voice change.    Eyes: Negative for visual disturbance.   Respiratory: Negative for cough and shortness of breath.    Cardiovascular: Negative for chest pain.   Gastrointestinal: Negative for  "abdominal pain, diarrhea, nausea and vomiting.   Endocrine: Negative for cold intolerance and heat intolerance.   Genitourinary: Negative for frequency and urgency.   Musculoskeletal: Negative for arthralgias.   Skin: Negative for rash.   Neurological: Positive for tingling (some in the feet). Negative for syncope and numbness.   Hematological: Does not bruise/bleed easily.   Psychiatric/Behavioral: Negative for depressed mood. The patient is not nervous/anxious.        Physical Exam:  Vital Signs:  Vital Signs:   /68   Pulse 88   Temp 97.3 °F (36.3 °C)   Resp 18   Ht 154.9 cm (60.98\")   Wt 66.8 kg (147 lb 3.2 oz)   SpO2 98%   BMI 27.83 kg/m²     Result Review :                Physical Exam  Vitals reviewed.   Constitutional:       General: She is not in acute distress.     Appearance: Normal appearance. She is well-developed. She is not diaphoretic.   HENT:      Head: Normocephalic and atraumatic.   Eyes:      General:         Right eye: No discharge.         Left eye: No discharge.   Cardiovascular:      Rate and Rhythm: Normal rate and regular rhythm.      Heart sounds: Normal heart sounds. No murmur heard.   No friction rub. No gallop.    Pulmonary:      Effort: Pulmonary effort is normal. No respiratory distress.      Breath sounds: Normal breath sounds. No wheezing or rales.   Musculoskeletal:         General: No deformity.      Lumbar back: No deformity, spasms or tenderness. Normal range of motion. Negative right straight leg raise test and negative left straight leg raise test.   Skin:     General: Skin is warm and dry.      Findings: No rash.   Neurological:      Mental Status: She is alert and oriented to person, place, and time.      Motor: No abnormal muscle tone.      Coordination: Coordination normal.      Gait: Gait normal.      Deep Tendon Reflexes: Reflexes normal.   Psychiatric:         Behavior: Behavior is cooperative.         Assessment and Plan:  Problems Addressed this Visit     "    Cardiac and Vasculature    Mixed hyperlipidemia - Primary     Lipid abnormalities are worsening.  Nutritional counseling was provided.  Lipids will be reassessed in 3 months.            Musculoskeletal and Injuries    Right sided sciatica     Ice three times a day for about 10-15 minutes for the first 1-2 days. Then may alternate heat and ice. Better body mechanics discussed. Home exercises discussed and hand out given. Discussed nsaids and if they can be taken. May need imaging and or PT if persists.  Discussed red flags, if there is severe pain, fever with pain, loss of movement in one or both legs pain, numbness in groin or both legs, trouble urinating or defecating on oneself, then patient is to go to the ER.     Will get MRI.   Start elavil         Relevant Medications    amitriptyline (ELAVIL) 25 MG tablet    Other Relevant Orders    MRI Lumbar Spine Without Contrast       Sleep    Other insomnia     Start elavil  Diagnosis, treatment and and course discussed. Potential side effects discussed. Return if there is worsening or persistence of symptoms.            Relevant Medications    amitriptyline (ELAVIL) 25 MG tablet       Other    RMSF ([place] spotted fever)     She is on doxycycline           Diagnoses       Codes Comments    Mixed hyperlipidemia    -  Primary ICD-10-CM: E78.2  ICD-9-CM: 272.2     Right sided sciatica     ICD-10-CM: M54.31  ICD-9-CM: 724.3     RMSF ([place] spotted fever)     ICD-10-CM: A77.0  ICD-9-CM: 082.0     Other insomnia     ICD-10-CM: G47.09  ICD-9-CM: 780.52            An After Visit Summary and PPPS were given to the patient.         I wore protective equipment throughout this patient encounter to include mask. Hand hygiene was performed before donning protective equipment and after removal when leaving the room.

## 2021-07-01 NOTE — TELEPHONE ENCOUNTER
----- Message from Alia Roe MD sent at 7/1/2021  8:13 AM EDT -----  Her RMSF is positive. Start doxycycline 100mg bid for 14 days

## 2021-07-02 ENCOUNTER — OFFICE VISIT (OUTPATIENT)
Dept: FAMILY MEDICINE CLINIC | Facility: CLINIC | Age: 51
End: 2021-07-02

## 2021-07-02 VITALS
HEIGHT: 61 IN | WEIGHT: 147.2 LBS | SYSTOLIC BLOOD PRESSURE: 124 MMHG | DIASTOLIC BLOOD PRESSURE: 68 MMHG | RESPIRATION RATE: 18 BRPM | TEMPERATURE: 97.3 F | HEART RATE: 88 BPM | BODY MASS INDEX: 27.79 KG/M2 | OXYGEN SATURATION: 98 %

## 2021-07-02 DIAGNOSIS — E78.2 MIXED HYPERLIPIDEMIA: Primary | ICD-10-CM

## 2021-07-02 DIAGNOSIS — M54.31 RIGHT SIDED SCIATICA: ICD-10-CM

## 2021-07-02 DIAGNOSIS — A77.0 RMSF (ROCKY MOUNTAIN SPOTTED FEVER): ICD-10-CM

## 2021-07-02 DIAGNOSIS — G47.09 OTHER INSOMNIA: ICD-10-CM

## 2021-07-02 PROBLEM — M54.50 ACUTE BILATERAL LOW BACK PAIN WITHOUT SCIATICA: Status: RESOLVED | Noted: 2019-12-05 | Resolved: 2021-07-02

## 2021-07-02 PROCEDURE — 99214 OFFICE O/P EST MOD 30 MIN: CPT | Performed by: FAMILY MEDICINE

## 2021-07-02 RX ORDER — AMITRIPTYLINE HYDROCHLORIDE 25 MG/1
25 TABLET, FILM COATED ORAL NIGHTLY
Qty: 30 TABLET | Refills: 3 | Status: SHIPPED | OUTPATIENT
Start: 2021-07-02 | End: 2021-08-03

## 2021-07-02 NOTE — ASSESSMENT & PLAN NOTE
Start elavil  Diagnosis, treatment and and course discussed. Potential side effects discussed. Return if there is worsening or persistence of symptoms.

## 2021-07-02 NOTE — ASSESSMENT & PLAN NOTE
Ice three times a day for about 10-15 minutes for the first 1-2 days. Then may alternate heat and ice. Better body mechanics discussed. Home exercises discussed and hand out given. Discussed nsaids and if they can be taken. May need imaging and or PT if persists.  Discussed red flags, if there is severe pain, fever with pain, loss of movement in one or both legs pain, numbness in groin or both legs, trouble urinating or defecating on oneself, then patient is to go to the ER.     Will get MRI.   Start elavil

## 2021-07-20 ENCOUNTER — TELEPHONE (OUTPATIENT)
Dept: FAMILY MEDICINE CLINIC | Facility: CLINIC | Age: 51
End: 2021-07-20

## 2021-07-20 NOTE — TELEPHONE ENCOUNTER
Caller: Aure Bernard    Relationship: Self    Best call back number: [phone]     What medication are you requesting: A NEW SLEEP MEDICATION, OR A NEW DOSAGE, WHATEVER HER PROVIDER RECOMMENDS.    What are your current symptoms: PATIENT IS CURRENTLY TAKING amitriptyline (ELAVIL) 25 MG tablet. SHE STATES THAT SHE TOOK HER MEDICATION ON A Friday AND SLEPT ALL DAY ON Saturday. SHE HAD TROUBLE STAYING AWAKE FOR MORE THAN 10 MINUTES.    Have you had these symptoms before:    [] Yes  [x] No    Have you been treated for these symptoms before:   [] Yes  [x] No    If a prescription is needed, what is your preferred pharmacy and phone number:  [id number] [address] [address] [address] AT [facility] [address]  [address], [place] - [phone] [facility] [phone] FX

## 2021-07-26 DIAGNOSIS — G47.09 OTHER INSOMNIA: Primary | ICD-10-CM

## 2021-07-26 RX ORDER — HYDROXYZINE HYDROCHLORIDE 25 MG/1
25 TABLET, FILM COATED ORAL NIGHTLY PRN
Qty: 30 TABLET | Refills: 1 | Status: SHIPPED | OUTPATIENT
Start: 2021-07-26 | End: 2021-08-03

## 2021-07-27 ENCOUNTER — DOCUMENTATION (OUTPATIENT)
Dept: PHYSICAL THERAPY | Facility: CLINIC | Age: 51
End: 2021-07-27

## 2021-07-27 DIAGNOSIS — M25.521 RIGHT ELBOW PAIN: Primary | ICD-10-CM

## 2021-07-27 DIAGNOSIS — M25.511 ACUTE PAIN OF RIGHT SHOULDER: ICD-10-CM

## 2021-07-27 NOTE — PROGRESS NOTES
Discharge Summary  Discharge Summary from Physical Therapy Report      Dates  PT visit: 12/17/2020 - 2/4/2021  Number of Visits: 8     Discharge Status of Patient: See MD Note dated 2/4/2021    Goals: Partially Met    Discharge Plan: Continue with current home exercise program as instructed    Comments : pt cancelled last scheduled appt due to bad weather and did not schedule additional visits. Pt was making progress toward goals with decreased R shoulder and elbow pain.    Date of Discharge 7/27/2021        Karen Goddard, PT  Physical Therapist

## 2021-07-28 ENCOUNTER — HOSPITAL ENCOUNTER (OUTPATIENT)
Dept: MRI IMAGING | Facility: HOSPITAL | Age: 51
Discharge: HOME | End: 2021-07-28
Admitting: FAMILY MEDICINE

## 2021-07-28 DIAGNOSIS — M54.31 RIGHT SIDED SCIATICA: ICD-10-CM

## 2021-07-28 PROCEDURE — 72148 MRI LUMBAR SPINE W/O DYE: CPT

## 2021-07-30 ENCOUNTER — TELEPHONE (OUTPATIENT)
Dept: FAMILY MEDICINE CLINIC | Facility: CLINIC | Age: 51
End: 2021-07-30

## 2021-08-02 NOTE — PROGRESS NOTES
Subjective   Aure Bernard is a 51 y.o. female.     Chief Complaint   Patient presents with   • Insomnia   • Leg Pain       Patient stated that she tried amitriptyline and she has side effects from it. She was worried about taking the hydroxyzine so she has not taken it yet  Patient started taking womans nelson multivitamin, sleep and stress  With the nelson sleep multivitamin she has gotten about 9 hrs of sleep.       Insomnia  The current episode started 1 to 4 weeks ago. The problem occurs daily. The problem has been gradually improving. Pertinent negatives include no abdominal pain, arthralgias, chest pain, chills, congestion, coughing, fatigue, fever, nausea, numbness, rash, visual change or vomiting. Nothing aggravates the symptoms. Treatments tried: nelson vitamin. The treatment provided significant relief.   Leg Pain   The incident occurred more than 1 week ago. There was no injury mechanism. The pain is present in the left hip, right hip, right ankle, left knee, left ankle and right knee. The quality of the pain is described as aching. The pain has been fluctuating since onset. Associated symptoms include muscle weakness and tingling (some in feet). Pertinent negatives include no inability to bear weight, loss of motion, loss of sensation or numbness. Associated symptoms comments: Patient is also having low back pain  . Exacerbated by: as the day goes on. Treatments tried: naproxen.        I personally reviewed and updated the patient's allergies, medications, problem list, and past medical, surgical, social, and family history. I have reviewed and confirmed the accuracy of the History of Present Illness and Review of Symptoms as documented by the MA/LPN/RN. Alia Roe MD    Allergies:  No Known Allergies    Social History:  Social History     Socioeconomic History   • Marital status:      Spouse name: Not on file   • Number of children: Not on file   • Years of education: Not on file   • Highest  education level: Not on file   Tobacco Use   • Smoking status: Former Smoker     Types: Cigarettes   • Smokeless tobacco: Never Used   • Tobacco comment: quit [id number]   Vaping Use   • Vaping Use: Never used   Substance and Sexual Activity   • Alcohol use: Not Currently   • Drug use: No   • Sexual activity: Defer       Family History:  Family History   Problem Relation Age of Onset   • Colon cancer Maternal Grandfather    • Breast cancer Neg Hx    • Ovarian cancer Neg Hx        Past Medical History :  Patient Active Problem List   Diagnosis   • Allergic rhinitis due to pollen   • Anxiety   • Convulsions (CMS/HCC)   • Daytime somnolence   • Dense breast   • Gastroesophageal reflux disease   • Irritable colon   • Overweight (BMI 25.0-29.9)   • Encounter for screening mammogram for malignant neoplasm of breast   • Encounter for annual general medical examination with abnormal findings in adult   • History of tobacco use   • H/O: hysterectomy   • Acute pain of right shoulder   • Screening mammogram, encounter for   • Menopausal symptom   • Mixed hyperlipidemia   • Right elbow joint crepitus   • Tension headache   • Acute pain of both knees   • Polyarthralgia   • Right sided sciatica   • RMSF ([place]au [place] spotted fever)   • Other insomnia       Medication List:    Current Outpatient Medications:   •  coenzyme Q10 100 MG capsule, Take 100 mg by mouth Daily., Disp: , Rfl:   •  fexofenadine (ALLEGRA) 180 MG tablet, Take  by mouth Daily., Disp: , Rfl:   •  naproxen (NAPROSYN) 500 MG tablet, Take 1 tablet by mouth 2 (Two) Times a Day As Needed for Mild Pain ., Disp: 60 tablet, Rfl: 1  •  Omega-3 Fatty Acids (fish oil) 1000 MG capsule capsule, Take  by mouth Daily With Breakfast., Disp: , Rfl:   •  Omeprazole 20 MG Tablet Delayed Release Dispersible, Take  by mouth Daily., Disp: , Rfl:   •  polyethylene glycol (MIRALAX) powder, Take  by mouth Daily., Disp: , Rfl:     Past Surgical History:  Past Surgical History:   Procedure  "Laterality Date   • HYSTERECTOMY         Review of Systems:  Review of Systems   Constitutional: Negative for activity change, chills, fatigue and fever.   HENT: Negative for congestion, ear pain, rhinorrhea, sinus pressure and voice change.    Eyes: Negative for visual disturbance.   Respiratory: Negative for cough and shortness of breath.    Cardiovascular: Negative for chest pain.   Gastrointestinal: Negative for abdominal pain, diarrhea, nausea and vomiting.   Endocrine: Negative for cold intolerance and heat intolerance.   Genitourinary: Negative for frequency and urgency.   Musculoskeletal: Negative for arthralgias.   Skin: Negative for rash.   Neurological: Positive for tingling (some in feet). Negative for syncope and numbness.   Hematological: Does not bruise/bleed easily.   Psychiatric/Behavioral: Negative for depressed mood. The patient has insomnia. The patient is not nervous/anxious.        Physical Exam:  Vital Signs:  Vital Signs:   /74   Pulse 99   Temp 97.7 °F (36.5 °C)   Resp 18   Ht 154.9 cm (60.98\")   Wt 86.2 kg (190 lb)   SpO2 97%   BMI 35.92 kg/m²     Result Review :                Physical Exam  Vitals reviewed.   Constitutional:       General: She is not in acute distress.     Appearance: She is well-developed. She is not diaphoretic.   Eyes:      General:         Right eye: No discharge.         Left eye: No discharge.   Cardiovascular:      Rate and Rhythm: Normal rate and regular rhythm.      Heart sounds: Normal heart sounds. No murmur heard.   No friction rub. No gallop.    Pulmonary:      Effort: Pulmonary effort is normal. No respiratory distress.      Breath sounds: Normal breath sounds. No wheezing or rales.   Musculoskeletal:         General: No deformity.      Lumbar back: No deformity, spasms or tenderness. Normal range of motion.   Skin:     General: Skin is warm and dry.      Findings: No rash.   Neurological:      Mental Status: She is alert and oriented to person, " place, and time.      Motor: No abnormal muscle tone.      Coordination: Coordination normal.      Deep Tendon Reflexes: Reflexes normal.         Assessment and Plan:  Problems Addressed this Visit        Mental Health    Anxiety     She added ashwaganda and sleeping so she is feeling better            Musculoskeletal and Injuries    Right sided sciatica     Some better  Start PT and referral to pain management  Discussed red flags, if there is severe pain, fever with pain, loss of movement in one or both legs pain, numbness in groin or both legs, trouble urinating or defecating on oneself, then patient is to go to the ER.            Relevant Orders    Ambulatory Referral to Pain Management    Ambulatory Referral to Physical Therapy Evaluate and treat       Sleep    Other insomnia - Primary     improved           Diagnoses       Codes Comments    Other insomnia    -  Primary ICD-10-CM: G47.09  ICD-9-CM: 780.52     Anxiety     ICD-10-CM: F41.9  ICD-9-CM: 300.00     Right sided sciatica     ICD-10-CM: M54.31  ICD-9-CM: 724.3            An After Visit Summary and PPPS were given to the patient.         I wore protective equipment throughout this patient encounter to include mask. Hand hygiene was performed before donning protective equipment and after removal when leaving the room.

## 2021-08-03 ENCOUNTER — OFFICE VISIT (OUTPATIENT)
Dept: FAMILY MEDICINE CLINIC | Facility: CLINIC | Age: 51
End: 2021-08-03

## 2021-08-03 VITALS
HEIGHT: 61 IN | BODY MASS INDEX: 35.87 KG/M2 | OXYGEN SATURATION: 97 % | DIASTOLIC BLOOD PRESSURE: 74 MMHG | SYSTOLIC BLOOD PRESSURE: 126 MMHG | WEIGHT: 190 LBS | HEART RATE: 99 BPM | TEMPERATURE: 97.7 F | RESPIRATION RATE: 18 BRPM

## 2021-08-03 DIAGNOSIS — M54.31 RIGHT SIDED SCIATICA: ICD-10-CM

## 2021-08-03 DIAGNOSIS — F41.9 ANXIETY: ICD-10-CM

## 2021-08-03 DIAGNOSIS — G47.09 OTHER INSOMNIA: Primary | ICD-10-CM

## 2021-08-03 PROCEDURE — 99214 OFFICE O/P EST MOD 30 MIN: CPT | Performed by: FAMILY MEDICINE

## 2021-08-03 RX ORDER — UBIDECARENONE 100 MG
100 CAPSULE ORAL DAILY
COMMUNITY

## 2021-08-09 NOTE — ASSESSMENT & PLAN NOTE
Some better  Start PT and referral to pain management  Discussed red flags, if there is severe pain, fever with pain, loss of movement in one or both legs pain, numbness in groin or both legs, trouble urinating or defecating on oneself, then patient is to go to the ER.

## 2021-08-26 ENCOUNTER — TREATMENT (OUTPATIENT)
Dept: PHYSICAL THERAPY | Facility: CLINIC | Age: 51
End: 2021-08-26

## 2021-08-26 DIAGNOSIS — M54.41 ACUTE BILATERAL LOW BACK PAIN WITH RIGHT-SIDED SCIATICA: ICD-10-CM

## 2021-08-26 DIAGNOSIS — M54.16 RADICULOPATHY, LUMBAR REGION: Primary | ICD-10-CM

## 2021-08-26 PROCEDURE — 97012 MECHANICAL TRACTION THERAPY: CPT | Performed by: PHYSICAL THERAPIST

## 2021-08-26 PROCEDURE — 97110 THERAPEUTIC EXERCISES: CPT | Performed by: PHYSICAL THERAPIST

## 2021-08-26 PROCEDURE — 97162 PT EVAL MOD COMPLEX 30 MIN: CPT | Performed by: PHYSICAL THERAPIST

## 2021-08-26 NOTE — PROGRESS NOTES
Physical Therapy Initial Evaluation and Plan of Care    Patient: Aure Bernard   : 1970  Diagnosis/ICD-10 Code:  Radiculopathy, lumbar region [M54.16]  Referring practitioner: Alia Roe MD  Date of Initial Visit: 2021  Today's Date: 2021  Patient seen for 1 sessions           Subjective Questionnaire: Oswestry: /50, indicating 24% impairment      Subjective Evaluation    History of Present Illness  Mechanism of injury: Pt reports low back pain and B LE pain for a couple months. States she was dx with [place] Spotted Fever. Finished antibiotics and continues to have LE pain. Having difficulty getting up from chair/couch and has to use her arms. Having constant R sciatic pain with numbness into foot. Intermittent tingling L LE. Increased pain with driving. Denies any injury or incident at times of back pain onset. Back pain is located at lower thoracic on R side, across low back.  Increased pain with prolonged sitting and feels like she has wt shifted to the L. Increased pain with standing prolonged standing. Has to change position frequently at work and has a standing desk ordered. Having numbness in B arms at night also that wakes her up. Difficulty sleeping. Legs feel weak in the morning. States at least once a week, R foot drags and almost falls. Heat decreases back pain. Taking Naproxen 3-4x/wk. Denies any changes in bowel/bladder function.      Patient Occupation: Workers comp coordinator at [employer]  Current pain rating: 3  At best pain rating: 3  At worst pain ratin  Location: Low back, R LE  Quality: discomfort, dull ache, needle-like and radiating  Relieving factors: change in position and heat  Aggravating factors: lifting, movement, standing, ambulation, prolonged positioning, squatting and repetitive movement  Progression: worsening    Social Support  Lives with: spouse    Diagnostic Tests  MRI studies: abnormal (lumbar DDD, mild foraminal narrowing L4-5 &  L5-S1)    Patient Goals  Patient goals for therapy: decreased pain, increased motion, increased strength, independence with ADLs/IADLs and return to work             Objective          Postural Observations  Seated posture: fair  Standing posture: fair    Additional Postural Observation Details  Seated: increased wt bearing on L hip.  Standing: decreased lumbar lordosis.  Gait: decreased zaki, decreased stance time on R.    Palpation   Left   Tenderness of the lumbar paraspinals and proximal biceps femoris.     Right   Hypertonic in the proximal biceps femoris. Tenderness of the lumbar paraspinals, proximal biceps femoris and TFL.     Tenderness     Left Hip   Tenderness in the PSIS.     Right Hip   Tenderness in the PSIS.     Active Range of Motion     Additional Active Range of Motion Details  Lumbar AROM:  Flex 75% with pain  Ext 75% with pain  Sidebending B 50% with pain  Rotation B 50% with pain to R.    Strength/Myotome Testing     Left Hip   Planes of Motion   Flexion: 3+  Abduction: 4-    Right Hip   Planes of Motion   Flexion: 3+  Abduction: 3+    Left Knee   Flexion: 5  Extension: 5    Right Knee   Flexion: 5  Extension: 5    Left Ankle/Foot   Dorsiflexion: 5    Right Ankle/Foot   Dorsiflexion: 4+    Tests     Lumbar     Left   Positive passive SLR.     Right   Negative passive SLR.     Lumbar Flexibility Comments:   Tightness present B hamstring, B hip flexors, and B piriformis (R>L).          Assessment & Plan     Assessment  Impairments: abnormal gait, abnormal muscle firing, abnormal muscle tone, abnormal or restricted ROM, activity intolerance, impaired balance, impaired physical strength, lacks appropriate home exercise program, pain with function, safety issue and weight-bearing intolerance  Assessment details: Pt is a 51 y.o. female with low back pain and B LE radicular symptoms, R>L. Pt presents with decreased lumbar AROM and core strength and significant weakness of B hips and R LE. Pt is  having difficulty with prolonged sitting. Difficulty with prolonged standing. Difficulty transfer sit to stand. Difficulty with R foot dragging at times during ambulation and has had near falls. Increased pain with driving. Difficulty sleeping. Oswestry indicates 24% impairment.    Patient presents with the impairments listed above and based on the objective findings and the physical therapy evaluation, the patient's condition has the potential to improve in response to therapy.   The patient's condition and/or services required are at a level of complexity that necessitates the skill & supervision of a physical therapist.    Prognosis: good  Functional Limitations: carrying objects, lifting, sleeping, walking, pulling, uncomfortable because of pain, moving in bed, sitting, standing, stooping and unable to perform repetitive tasks  Goals  Plan Goals: STG:  - Pt to report 50% improvement in ability to transfer sit to  3 weeks.  - Pt to report 25% or better improvement in R LE radicular pain in 3 weeks.  - Pt to be independent with HEP in 3 weeks.  LTG:  - Improve Oswestry to 15% or less impairment by discharge.  - Increase B hip flex and abd strength to 4/5 or better for improved transfers and ambulation by discharge.  - Pt to rate max pain at 2-3/10 with activity by discharge.  - Pt to report 75% or better centralization of LE radicular symptoms by discharge.    Plan  Therapy options: will be seen for skilled physical therapy services  Planned modality interventions: electrical stimulation/Russian stimulation, thermotherapy (hydrocollator packs), traction, ultrasound and dry needling  Planned therapy interventions: manual therapy, abdominal trunk stabilization, balance/weight-bearing training, body mechanics training, flexibility, functional ROM exercises, home exercise program, joint mobilization, neuromuscular re-education, postural training, soft tissue mobilization, spinal/joint mobilization,  strengthening, stretching, therapeutic activities, IADL retraining and transfer training  Frequency: 1x week (1-2x/wk)  Duration in visits: 12  Treatment plan discussed with: patient        Timed:         Manual Therapy:         mins  18364;     Therapeutic Exercise:    15     mins  62913;     Neuromuscular Abby:        mins  25859;    Therapeutic Activity:          mins  94239;     Gait Training:           mins  53506;     Ultrasound:          mins  97173;    Ionto                                   mins   77255  Can Repos          mins 25856    Un-Timed:  Electrical Stimulation:         mins  37132 ( );  Dry Needling          mins self-pay  Traction    15      mins 51633  Low Eval          Mins  63544  Mod Eval     30     Mins  00469  High Eval                            Mins  44346  Self - Care                          mins  48260        Timed Treatment:   15   mins   Total Treatment:     60   mins    PT SIGNATURE: Karen Goddard, SANTI   DATE TREATMENT INITIATED: 8/26/2021    Initial Certification  Certification Period: 11/24/2021  I certify that the therapy services are furnished while this patient is under my care.  The services outlined above are required by this patient, and will be reviewed every 90 days.     PHYSICIAN: Alia Roe MD  _________________________________________________________    DATE:  ______________________________________________    Please sign and return via fax to [phone].. Thank you, [facility].

## 2021-09-01 ENCOUNTER — TREATMENT (OUTPATIENT)
Dept: PHYSICAL THERAPY | Facility: CLINIC | Age: 51
End: 2021-09-01

## 2021-09-01 DIAGNOSIS — M54.16 RADICULOPATHY, LUMBAR REGION: Primary | ICD-10-CM

## 2021-09-01 DIAGNOSIS — M54.41 ACUTE BILATERAL LOW BACK PAIN WITH RIGHT-SIDED SCIATICA: ICD-10-CM

## 2021-09-01 PROCEDURE — 97140 MANUAL THERAPY 1/> REGIONS: CPT | Performed by: PHYSICAL THERAPIST

## 2021-09-01 PROCEDURE — 97110 THERAPEUTIC EXERCISES: CPT | Performed by: PHYSICAL THERAPIST

## 2021-09-01 NOTE — PROGRESS NOTES
Physical Therapy Daily Progress Note    VISIT#: 2 / 12 in POC.    Subjective   Aure Bernard reports she felt okay during mechanical traction last visit; but had onset of increased RLE numbness down to foot while standing after eval to make appts; therefore hold mechanical traction today.  Complains of sciatic in R hamstring area at start of session.      Objective   See Exercise, Manual, and Modality Logs for complete treatment.       ASSESSMENT  Complete reduction in R posterior thigh pain during/following manual LAD; symptoms remained diminished throughout entire treatment.  Felt much better after manual.  Therapist instructed pt to have her  perform this at home.  Pt tolerated new and progression of exercises/activities without problems.  Cues as noted.  No pain with any exercise.  No complications today.      PLAN  Continue current POC and progress as tolerated per PT evaluation.        Timed:  Therapeutic Exercise:    15     mins  18058;     Manual Therapy:    8     mins  96503;         Timed Treatment:   23   mins   Total Treatment:     23   mins    Chris Sarmiento, PT  [place] License # [id number]  Physical Therapist

## 2021-09-15 ENCOUNTER — TREATMENT (OUTPATIENT)
Dept: PHYSICAL THERAPY | Facility: CLINIC | Age: 51
End: 2021-09-15

## 2021-09-15 DIAGNOSIS — M54.16 RADICULOPATHY, LUMBAR REGION: Primary | ICD-10-CM

## 2021-09-15 PROCEDURE — 97530 THERAPEUTIC ACTIVITIES: CPT | Performed by: PHYSICAL THERAPIST

## 2021-09-15 PROCEDURE — 97110 THERAPEUTIC EXERCISES: CPT | Performed by: PHYSICAL THERAPIST

## 2021-09-15 NOTE — PROGRESS NOTES
Physical Therapy Daily Progress Note        Patient: Aure Bernard   : 1970  Diagnosis/ICD-10 Code:  Radiculopathy, lumbar region [M54.16]  Referring practitioner: Alia Roe MD  Date of Initial Visit: Type: THERAPY  Noted: 2021  Today's Date: 9/15/2021  Patient seen for 3 sessions.  POC 12, 1x/wk  Insurance pending    MRI studies: abnormal (lumbar DDD, mild foraminal narrowing L4-5 & L5-S1)           Subjective :  No pain currently. She tolerated last session well.  She has not had pain going down her R LE since last visit. She does note that when working around home, she feels strain in her back with bending, cleaning tubs, etc.     Objective   See Exercise, Manual, and Modality Logs for complete treatment.     EDUCATION:  Pt. Given body mechanics information.  See chart.     Assessment/Plan:  Pt. Shown how to use a towel roll for lumbar support. She was instructed in proper body mechanics including:  Rolling, supine to sit, squat, lunge, golf lift, and vacuuming.  Pt. Appeared to have a good understanding.     Progress per Plan of Care:  Monitor response, R LE and back symptoms.          Timed:                                                                          Manual Therapy:                 mins  93145;                  Therapeutic Exercise:   15      mins  93786;     Neuromuscular Abby:        mins  66455;    Therapeutic Activity:     15      mins  45579;     Gait Training:                      mins  33358;     Ultrasound:                         mins  09950;    Ionto                                   mins   98173  Self Care                            mins   59372  Canalith Repos                   mins  56149     Un-Timed:  Electrical Stimulation:         mins  86323 ( );  Dry Needling                       mins self-pay  Traction                               mins 06808  Low Eval                             Mins  53110  Mod Eval                             Mins  39207  High  Eval                            Mins  62781  Re-Eval                               mins  24171     Timed Treatment:   30  mins   Total Treatment:     30   mins         Stacy Lyon PTA  Physical Therapist Assistant

## 2021-09-22 ENCOUNTER — TREATMENT (OUTPATIENT)
Dept: PHYSICAL THERAPY | Facility: CLINIC | Age: 51
End: 2021-09-22

## 2021-09-22 DIAGNOSIS — M54.16 RADICULOPATHY, LUMBAR REGION: Primary | ICD-10-CM

## 2021-09-22 DIAGNOSIS — M54.41 ACUTE BILATERAL LOW BACK PAIN WITH RIGHT-SIDED SCIATICA: ICD-10-CM

## 2021-09-22 PROCEDURE — 97112 NEUROMUSCULAR REEDUCATION: CPT | Performed by: PHYSICAL THERAPIST

## 2021-09-22 PROCEDURE — 97110 THERAPEUTIC EXERCISES: CPT | Performed by: PHYSICAL THERAPIST

## 2021-09-22 NOTE — PROGRESS NOTES
Physical Therapy Daily Progress Note        Patient: Aure Bernard   : 1970  Diagnosis/ICD-10 Code:  Radiculopathy, lumbar region [M54.16]  Referring practitioner: Alia Roe MD  Date of Initial Visit: Type: THERAPY  Noted: 2021  Today's Date: 2021  Patient seen for 4 sessions.  POC 12, 1x/wk  Insurance 3/8, exp. 10/1/21    MRI studies: abnormal (lumbar DDD, mild foraminal narrowing L4-5 & L5-S1)       Oswestry:  20/50  40% impairment.     Subjective :  Pt. Has appointment with pain management 21.  Pain currently 3/10 across low back, worse on right.  Pain at its worst this past week 5/10. Sit to stand 50% improved.  R LE symptoms resolved.     Objective   See Exercise, Manual, and Modality Logs for complete treatment.     EDUCATION:  Pt. Given information on computer ergonomics.  Also, HEP progressed and issued.  See chart.     Exercises  Hooklying Isometric Clamshell - 2 x daily - 7 x weekly - 2 sets - 10 reps - 5 sec. hold  Supine Hip Adduction Isometric with Ball - 2 x daily - 7 x weekly - 2 sets - 10 reps - 5 sec. Hold    Patient Education  Desk Accessories to Increase Comfort  Introduction to Ergonomics  Desk Discomfort: Troubleshooting Physical Symptoms  Tips for Setting Up Your Home Workstation  Setting Up Your Workstation: Work Surface      Assessment/Plan:  Goals met as noted:     STG:  - Pt to report 50% improvement in ability to transfer sit to  3 weeks.-MET  - Pt to report 25% or better improvement in R LE radicular pain in 3 weeks.-MET  - Pt to be independent with HEP in 3 weeks.-MET  LTG:  - Improve Oswestry to 15% or less impairment by discharge.  - Increase B hip flex and abd strength to 4/5 or better for improved transfers and ambulation by discharge.  - Pt to rate max pain at 2-3/10 with activity by discharge.  - Pt to report 75% or better centralization of LE radicular symptoms by discharge.-MET    Progress per Plan of Care:         Timed:                                                                           Manual Therapy:                 mins  72768;                  Therapeutic Exercise:   20      mins  02833;     Neuromuscular Abby:   10     mins  07827;    Therapeutic Activity:           mins  33030;     Gait Training:                      mins  81807;     Ultrasound:                         mins  53378;    Ionto                                   mins   37592  Self Care                            mins   94887  Canalith Repos                   mins  03896     Un-Timed:  Electrical Stimulation:         mins  46274 ( );  Dry Needling                       mins self-pay  Traction                               mins 98041  Low Eval                             Mins  56912  Mod Eval                             Mins  72191  High Eval                            Mins  74685  Re-Eval                               mins  37057     Timed Treatment:   30  mins   Total Treatment:     30   mins         Stacy Lyon PTA  Physical Therapist Assistant

## 2021-09-23 ENCOUNTER — OFFICE VISIT (OUTPATIENT)
Dept: PAIN MEDICINE | Facility: CLINIC | Age: 51
End: 2021-09-23

## 2021-09-23 VITALS
HEART RATE: 88 BPM | SYSTOLIC BLOOD PRESSURE: 127 MMHG | RESPIRATION RATE: 16 BRPM | WEIGHT: 145 LBS | HEIGHT: 61 IN | BODY MASS INDEX: 27.38 KG/M2 | DIASTOLIC BLOOD PRESSURE: 83 MMHG | OXYGEN SATURATION: 98 % | TEMPERATURE: 97.1 F

## 2021-09-23 DIAGNOSIS — M51.9 FORAMINAL STENOSIS DUE TO INTERVERTEBRAL DISC DISEASE: Primary | ICD-10-CM

## 2021-09-23 DIAGNOSIS — M99.79 FORAMINAL STENOSIS DUE TO INTERVERTEBRAL DISC DISEASE: Primary | ICD-10-CM

## 2021-09-23 PROCEDURE — G0463 HOSPITAL OUTPT CLINIC VISIT: HCPCS | Performed by: STUDENT IN AN ORGANIZED HEALTH CARE EDUCATION/TRAINING PROGRAM

## 2021-09-23 PROCEDURE — 99204 OFFICE O/P NEW MOD 45 MIN: CPT | Performed by: STUDENT IN AN ORGANIZED HEALTH CARE EDUCATION/TRAINING PROGRAM

## 2021-09-23 RX ORDER — MAGNESIUM CHLORIDE 64 MG
TABLET, DELAYED RELEASE (ENTERIC COATED) ORAL DAILY
COMMUNITY

## 2021-09-23 RX ORDER — GABAPENTIN 300 MG/1
300 CAPSULE ORAL 3 TIMES DAILY
Qty: 90 CAPSULE | Refills: 0 | Status: SHIPPED | OUTPATIENT
Start: 2021-09-23 | End: 2022-06-14

## 2021-09-23 NOTE — PROGRESS NOTES
CHIEF COMPLAINT  Chief Complaint   Patient presents with   • Back Pain     LOW    NO NARCOTICS       Primary Care  Glass, Alia Mari MD    Subjective   Aure Benrard is a 51 y.o. female  who presents for chronic low back pain with right lower extremity radiculopathy.  She states the pain began approximately 6 months ago and has gotten progressively worse.  She has a significant history of [place] spotted fever and states that she feels like some the pain began after she is treated for her tickborne illness.  She describes pain in the low back with associated numbness and tingling in the right leg and right foot.  She denies any significant weakness but does have numbness from the foot to approximately the ankle on the right.  She has no significant left-sided symptoms.  She also complains of some pain in the right buttock and right posterior thigh    History of Present Illness     Location: Low back with radiation into the right lower extremity  Onset: 6 months  Duration: Constant, slightly improved  Timing: Constant throughout the day  Quality: Aching sensation with associated numbness and tingling  Severity: Today: 3       Last Week: 3       Worst: 6  Modifying Factors: The pain is worse with physical activity and movement and change in position.  The pain is slightly improved with rest and massage    Physical Therapy: yes    Interval Update 09/23/2021:     The following portions of the patient's history were reviewed and updated as appropriate: allergies, current medications, past family history, past medical history, past social history, past surgical history and problem list.      Current Outpatient Medications:   •  coenzyme Q10 100 MG capsule, Take 100 mg by mouth Daily., Disp: , Rfl:   •  fexofenadine (ALLEGRA) 180 MG tablet, Take  by mouth Daily., Disp: , Rfl:   •  magnesium chloride ER 64 MG DR tablet, Take  by mouth Daily., Disp: , Rfl:   •  naproxen (NAPROSYN) 500 MG tablet, Take 1 tablet by mouth 2  "(Two) Times a Day As Needed for Mild Pain ., Disp: 60 tablet, Rfl: 1  •  Omega-3 Fatty Acids (fish oil) 1000 MG capsule capsule, Take  by mouth Daily With Breakfast., Disp: , Rfl:   •  Omeprazole 20 MG Tablet Delayed Release Dispersible, Take  by mouth Daily., Disp: , Rfl:   •  polyethylene glycol (MIRALAX) powder, Take  by mouth Daily., Disp: , Rfl:   •  gabapentin (NEURONTIN) 300 MG capsule, Take 1 capsule by mouth 3 (Three) Times a Day., Disp: 90 capsule, Rfl: 0    Review of Systems   Musculoskeletal: Positive for arthralgias, back pain and gait problem.   Neurological: Positive for numbness. Negative for weakness.       Vitals:    09/23/21 0753   BP: 127/83   Pulse: 88   Resp: 16   Temp: 97.1 °F (36.2 °C)   SpO2: 98%   Weight: 65.8 kg (145 lb)   Height: 154.9 cm (61\")   PainSc:   3       Objective   Physical Exam  Vitals and nursing note reviewed.   Constitutional:       General: She is not in acute distress.     Appearance: Normal appearance. She is well-developed and normal weight.   Neck:      Trachea: No tracheal deviation.   Musculoskeletal:      Cervical back: Normal range of motion.      Comments: Lumbar Spine Exam:  Tender to palpation over the lumbar paraspinal musculature No  Limited range of motion secondary to pain No  Facet loading positive: Negative bilaterally  Facets tender to palpation: Negative  Straight leg raise test positive: Equivocal on the right    SI Joint Exam:  Jeff positive: Negative  PSIS tender: right   SI joint palpation: Equivocal  SI joint compression: Equivocal     Neurological:      Mental Status: She is alert.      Sensory: Sensory deficit present.      Motor: No weakness.      Comments: Decree sensation in the L5 and S1 distribution in the right leg and right foot.  The left extremities unremarkable.           Assessment/Plan   Problems Addressed this Visit     None      Visit Diagnoses     Foraminal stenosis due to intervertebral disc disease    -  Primary    Relevant " Orders    Epidural Block      Diagnoses       Codes Comments    Foraminal stenosis due to intervertebral disc disease    -  Primary ICD-10-CM: M99.79, M51.9  ICD-9-CM: 724.00, 722.90           Plan:  1. Her exam is somewhat benign however she does have sensory deficits in the right leg and right foot consistent with L5 and/or S1 distribution.  Her MRI is fairly benign however she does have some foraminal stenosis at the L4-5 and L5-S1 levels  2. Given that her symptoms are right-sided, will plan for right-sided L4 and L5 transforaminal epidural steroid injections.  I will start her on gabapentin 300 mg 3 times daily for radiculopathy.  3. If she is not getting significant benefit from the transforaminal, may consider piriformis injection under ultrasound as it does appear that she has sciatic distribution type pain  --- Follow-up next available for right-sided transforaminal epidural steroid injection           INSPECT REPORT    As part of the patient's treatment plan, I may be prescribing controlled substances. The patient has been made aware of appropriate use of such medications, including potential risk of somnolence, limited ability to drive and/or work safely, and the potential for dependence or overdose. It has also bee made clear that these medications are for use by this patient only, without concomitant use of alcohol or other substances unless prescribed.     Patient has completed prescribing agreement detailing terms of continued prescribing of controlled substances, including monitoring NICOLAS reports, urine drug screening, and pill counts if necessary. The patient is aware that inappropriate use will results in cessation of prescribing such medications.    INSPECT report has been reviewed and scanned into the patient's chart.    As the clinician, I personally reviewed the INSPECT from 9/22/2021.    History and physical exam exhibit continued safe and appropriate use of controlled substances.      EMR  Dragon/Transcription disclaimer:   Much of this encounter note is an electronic transcription/translation of spoken language to printed text. The electronic translation of spoken language may permit erroneous, or at times, nonsensical words or phrases to be inadvertently transcribed; Although I have reviewed the note for such errors, some may still exist.

## 2021-10-05 ENCOUNTER — TELEPHONE (OUTPATIENT)
Dept: PAIN MEDICINE | Facility: CLINIC | Age: 51
End: 2021-10-05

## 2021-10-05 NOTE — TELEPHONE ENCOUNTER
Caller: AMANDA SALAZAR    Relationship: PATIENT    Best call back number:  [phone]    What is the best time to reach you: ANYTIME    Who are you requesting to speak with (clinical staff, provider,  specific staff member): OUT PATIENT PROCEDURE     Do you know the name of the person who called:  NO    What was the call regarding:  PROCEDURE ON 10/14/21 WAS CANCELLED DUE TO INSURANCE REQUEST. PATIENT WAS TO CALL BACK.    Do you require a callback:  YES

## 2021-10-06 ENCOUNTER — TREATMENT (OUTPATIENT)
Dept: PHYSICAL THERAPY | Facility: CLINIC | Age: 51
End: 2021-10-06

## 2021-10-06 DIAGNOSIS — M54.16 RADICULOPATHY, LUMBAR REGION: Primary | ICD-10-CM

## 2021-10-06 DIAGNOSIS — M54.41 ACUTE BILATERAL LOW BACK PAIN WITH RIGHT-SIDED SCIATICA: ICD-10-CM

## 2021-10-06 PROCEDURE — 97530 THERAPEUTIC ACTIVITIES: CPT | Performed by: PHYSICAL THERAPIST

## 2021-10-06 PROCEDURE — 97110 THERAPEUTIC EXERCISES: CPT | Performed by: PHYSICAL THERAPIST
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
## 2021-10-11 ENCOUNTER — TREATMENT (OUTPATIENT)
Dept: PHYSICAL THERAPY | Facility: CLINIC | Age: 51
End: 2021-10-11

## 2021-10-11 DIAGNOSIS — M54.41 ACUTE BILATERAL LOW BACK PAIN WITH RIGHT-SIDED SCIATICA: ICD-10-CM

## 2021-10-11 DIAGNOSIS — M54.16 RADICULOPATHY, LUMBAR REGION: Primary | ICD-10-CM

## 2021-10-11 PROCEDURE — 97110 THERAPEUTIC EXERCISES: CPT | Performed by: PHYSICAL THERAPIST

## 2021-10-11 PROCEDURE — 97035 APP MDLTY 1+ULTRASOUND EA 15: CPT | Performed by: PHYSICAL THERAPIST

## 2021-10-11 PROCEDURE — 97112 NEUROMUSCULAR REEDUCATION: CPT | Performed by: PHYSICAL THERAPIST

## 2021-10-11 NOTE — PROGRESS NOTES
Physical Therapy Daily Progress Note        Patient: Aure Bernard   : 1970  Diagnosis/ICD-10 Code:  Radiculopathy, lumbar region [M54.16]  Referring practitioner: Alia Roe MD  Date of Initial Visit: Type: THERAPY  Noted: 2021  Today's Date: 10/11/2021  Patient seen for 6 sessions.  POC 12, 1x/wk  Insurance , exp. 21        MRI studies: abnormal (lumbar DDD, mild foraminal narrowing L4-5 & L5-S1)            Subjective :  Pain management (injection)  has been put off until after PT, 21. Pain on R side has really been bothering her.  Feels muscular. Currently 3/10 R low thoracic into lumbar.     Objective   See Exercise, Manual, and Modality Logs for complete treatment. Trialed ultrasound for pain.     EDUCATION:      Assessment/Plan:  Pt. Responded well to ultrasound.  B paraspinals tight and tender.    Progress per Plan of Care:         Timed:                                                                          Manual Therapy:                 mins  30042;                  Therapeutic Exercise:   10      mins  92734;     Neuromuscular Abby:  10      mins  53765;    Therapeutic Activity:           mins  65313;     Gait Training:                      mins  54779;     Ultrasound:                    10     mins  83849;    Ionto                                   mins   61615  Self Care                            mins   63325  Canalith Repos                   mins  22286     Un-Timed:  Electrical Stimulation:         mins  50801 ( );  Dry Needling                       mins self-pay  Traction                               mins 33020  Low Eval                             Mins  56028  Mod Eval                             Mins  26770  High Eval                            Mins  30455  Re-Eval                               mins  18889     Timed Treatment:   30  mins   Total Treatment:     30   mins         Stacy Lyon PTA  Physical Therapist Assistant

## 2021-10-18 ENCOUNTER — TREATMENT (OUTPATIENT)
Dept: PHYSICAL THERAPY | Facility: CLINIC | Age: 51
End: 2021-10-18

## 2021-10-18 DIAGNOSIS — M54.16 RADICULOPATHY, LUMBAR REGION: Primary | ICD-10-CM

## 2021-10-18 DIAGNOSIS — M54.41 ACUTE BILATERAL LOW BACK PAIN WITH RIGHT-SIDED SCIATICA: ICD-10-CM

## 2021-10-18 PROCEDURE — 97035 APP MDLTY 1+ULTRASOUND EA 15: CPT | Performed by: PHYSICAL THERAPIST

## 2021-10-18 PROCEDURE — 97110 THERAPEUTIC EXERCISES: CPT | Performed by: PHYSICAL THERAPIST

## 2021-10-18 PROCEDURE — 97140 MANUAL THERAPY 1/> REGIONS: CPT | Performed by: PHYSICAL THERAPIST

## 2021-10-18 PROCEDURE — 97112 NEUROMUSCULAR REEDUCATION: CPT | Performed by: PHYSICAL THERAPIST

## 2021-10-18 NOTE — PROGRESS NOTES
Physical Therapy Daily Progress Note        Patient: Aure Bernard   : 1970  Diagnosis/ICD-10 Code:  Radiculopathy, lumbar region [M54.16]  Referring practitioner: Alia Roe MD  Date of Initial Visit: Type: THERAPY  Noted: 2021  Today's Date: 10/18/2021  Patient seen for 7 sessions.  POC 12, 1x/wk  Insurance , exp. 21        MRI studies: abnormal (lumbar DDD, mild foraminal narrowing L4-5 & L5-S1)            Subjective :  Pain management (injection)  has been put off until after PT, 21. Ultrasound really helped last session.  Currently, pain 2/10 R low thoracic/lumbar.      Objective   See Exercise, Manual, and Modality Logs for complete treatment. Progression as noted. Added IASTM    EDUCATION:  Discussed pt. Trying anti-inflammatories and ice to R thoracic area.     Assessment/Plan:  IASTM following ultrasound to B thoracic.  Pt. With noted inflammation R thoracic.  Pt. To trial anti-inflammatories and ice to try to decrease inflammation.     Progress per Plan of Care:  Monitor response to ultrasound f/b IASTM and inflammatories/ice.        Timed:                                                                          Manual Therapy:          10       mins  26406;                  Therapeutic Exercise:   10      mins  46981;     Neuromuscular Abby:  10      mins  48864;    Therapeutic Activity:           mins  60919;     Gait Training:                      mins  81939;     Ultrasound:                    10     mins  19090;    Ionto                                   mins   65170  Self Care                            mins   88101  Canalith Repos                   mins  31928     Un-Timed:  Electrical Stimulation:         mins  35880 ( );  Dry Needling                       mins self-pay  Traction                               mins 41375  Low Eval                             Mins  02418  Mod Eval                             Mins  82125  High Eval                             Mins  22826  Re-Eval                               mins  72500     Timed Treatment:   40  mins   Total Treatment:     40   mins         Stacy Lyon PTA  Physical Therapist Assistant

## 2021-10-25 ENCOUNTER — TREATMENT (OUTPATIENT)
Dept: PHYSICAL THERAPY | Facility: CLINIC | Age: 51
End: 2021-10-25

## 2021-10-25 DIAGNOSIS — M54.16 RADICULOPATHY, LUMBAR REGION: Primary | ICD-10-CM

## 2021-10-25 DIAGNOSIS — M54.41 ACUTE BILATERAL LOW BACK PAIN WITH RIGHT-SIDED SCIATICA: ICD-10-CM

## 2021-10-25 PROCEDURE — 97140 MANUAL THERAPY 1/> REGIONS: CPT | Performed by: PHYSICAL THERAPIST

## 2021-10-25 PROCEDURE — 97035 APP MDLTY 1+ULTRASOUND EA 15: CPT | Performed by: PHYSICAL THERAPIST

## 2021-10-25 PROCEDURE — 97110 THERAPEUTIC EXERCISES: CPT | Performed by: PHYSICAL THERAPIST

## 2021-10-25 NOTE — PROGRESS NOTES
Physical Therapy Daily Progress Note      Patient: Aure Bernard   : 1970  Diagnosis/ICD-10 Code:  Radiculopathy, lumbar region [M54.16]   Problems Addressed this Visit     None      Visit Diagnoses     Radiculopathy, lumbar region    -  Primary    Acute bilateral low back pain with right-sided sciatica          Diagnoses       Codes Comments    Radiculopathy, lumbar region    -  Primary ICD-10-CM: M54.16  ICD-9-CM: 724.4     Acute bilateral low back pain with right-sided sciatica     ICD-10-CM: M54.41  ICD-9-CM: 724.2, 724.3, 338.19         Referring practitioner: Alia Roe MD  Date of Initial Visit: Type: THERAPY  Noted: 2021  Today's Date: 10/25/2021    VISIT#: 8    Subjective : Pt reports her low back is feeling better but still have pain in lower thoracic region on R side. States it feels tight and the muscle will not relax. Has been doing doorway stretch and it helps. Rates current pain at 2/10. Continues to state US and STM helps.    Objective : B hip flex = 4-/5 to 4/5  B hip abd = 4/5    See Exercise, Manual, and Modality Logs for complete treatment.     Assessment/Plan : Continued R thoracic muscle tightness and increased tension. Responding well to US followed by IASTM and stretching. Pt will benefit from continued PT to further decrease pain.    STG:  - Pt to report 50% improvement in ability to transfer sit to  3 weeks. - MET  - Pt to report 25% or better improvement in R LE radicular pain in 3 weeks. - MET  - Pt to be independent with HEP in 3 weeks. - MET  LTG:  - Improve Oswestry to 15% or less impairment by discharge. - Progressing  - Increase B hip flex and abd strength to 4/5 or better for improved transfers and ambulation by discharge. - Progressing  - Pt to rate max pain at 2-3/10 with activity by discharge. - Progressing  - Pt to report 75% or better centralization of LE radicular symptoms by discharge. - MET    Progress per Plan of Care         Timed:         Manual  Therapy:    10     mins  71791;     Therapeutic Exercise:    10     mins  70621;     Neuromuscular Abby:        mins  70892;    Therapeutic Activity:          mins  99535;     Gait Training:           mins  16057;     Ultrasound:     10     mins  99943;    Ionto                                   mins   12672  Self Care                            mins   73390  Canalith Repos                   mins  09390    Un-Timed:  Electrical Stimulation:         mins  68531 ( );  Dry Needling          mins self-pay  Traction          mins 76807  Low Eval          Mins  61906  Mod Eval          Mins  34812  High Eval                            Mins  54321  Re-Eval                               mins  62528    Timed Treatment:   30   mins   Total Treatment:     30   mins    Karen Goddard, PT  Physical Therapist

## 2021-11-08 ENCOUNTER — TREATMENT (OUTPATIENT)
Dept: PHYSICAL THERAPY | Facility: CLINIC | Age: 51
End: 2021-11-08

## 2021-11-08 DIAGNOSIS — M54.41 ACUTE BILATERAL LOW BACK PAIN WITH RIGHT-SIDED SCIATICA: ICD-10-CM

## 2021-11-08 DIAGNOSIS — M54.16 RADICULOPATHY, LUMBAR REGION: Primary | ICD-10-CM

## 2021-11-08 PROCEDURE — 97110 THERAPEUTIC EXERCISES: CPT | Performed by: PHYSICAL THERAPIST

## 2021-11-08 PROCEDURE — 97035 APP MDLTY 1+ULTRASOUND EA 15: CPT | Performed by: PHYSICAL THERAPIST

## 2021-11-08 PROCEDURE — 97140 MANUAL THERAPY 1/> REGIONS: CPT | Performed by: PHYSICAL THERAPIST

## 2021-11-08 NOTE — PROGRESS NOTES
Physical Therapy Daily Progress Note    Patient: Aure Bernard   : 1970  Diagnosis/ICD-10 Code:  Radiculopathy, lumbar region [M54.16]  Referring practitioner: Alia Roe MD  Date of Initial Visit: Type: THERAPY  Noted: 2021  Today's Date: 2021  Patient seen for 9 sessions             Subjective Patient reports she is feeling much better.    Objective   See Exercise, Manual, and Modality Logs for complete treatment.       Assessment/Plan  STG:  - Pt to report 50% improvement in ability to transfer sit to  3 weeks. - MET  - Pt to report 25% or better improvement in R LE radicular pain in 3 weeks. - MET  - Pt to be independent with HEP in 3 weeks. - MET  LTG:  - Improve Oswestry to 15% or less impairment by discharge. - Progressing  - Increase B hip flex and abd strength to 4/5 or better for improved transfers and ambulation by discharge. - Progressing  - Pt to rate max pain at 2-3/10 with activity by discharge. - Progressing  - Pt to report 75% or better centralization of LE radicular symptoms by discharge. - MET    Progress per Plan of Care up to 12 visits            Timed:         Manual Therapy:    10     mins  65275;     Therapeutic Exercise:    10     mins  52937;     Neuromuscular Abby:        mins  38444;    Therapeutic Activity:          mins  50922;     Gait Training:           mins  54203;     Ultrasound:     10     mins  09137;    Ionto                                   mins   00027  Self Care                            mins   48386      Un-Timed:  Electrical Stimulation:         mins  46581 (MC );  Dry Needling          mins self-pay  Traction          mins 00777  Low Eval          Mins  53419  Mod Eval          Mins  65267  High Eval                            Mins  77553  Canalith Repos                   mins  48481    Timed Treatment:   30   mins   Total Treatment:     30   mins    Robin A Sprigler, PT  Physical Therapist

## 2021-12-09 ENCOUNTER — DOCUMENTATION (OUTPATIENT)
Dept: PHYSICAL THERAPY | Facility: CLINIC | Age: 51
End: 2021-12-09

## 2021-12-09 DIAGNOSIS — M54.16 RADICULOPATHY, LUMBAR REGION: Primary | ICD-10-CM

## 2021-12-09 DIAGNOSIS — M54.41 ACUTE BILATERAL LOW BACK PAIN WITH RIGHT-SIDED SCIATICA: ICD-10-CM

## 2021-12-09 NOTE — PROGRESS NOTES
Discharge Summary  Discharge Summary from Physical Therapy Report        Goals: Partially Met    Discharge Plan: Continue with current home exercise program as instructed    Comments Patient failed to return for treatment.  Last seen 11/8/2021  Patient seen for 9 sessions                  Subjective Patient reports she is feeling much better.     Objective   See Exercise, Manual, and Modality Logs for complete treatment.         Assessment/Plan  STG:  - Pt to report 50% improvement in ability to transfer sit to  3 weeks. - MET  - Pt to report 25% or better improvement in R LE radicular pain in 3 weeks. - MET  - Pt to be independent with HEP in 3 weeks. - MET  LTG:  - Improve Oswestry to 15% or less impairment by discharge. - Progressing  - Increase B hip flex and abd strength to 4/5 or better for improved transfers and ambulation by discharge. - Progressing  - Pt to rate max pain at 2-3/10 with activity by discharge. - Progressing  - Pt to report 75% or better centralization of LE radicular symptoms by discharge. - MET      Date of Discharge 12/9/21        Robin A Sprigler, PT  Physical Therapist

## 2022-06-13 NOTE — PROGRESS NOTES
"  Chief Complaint   Patient presents with   • Annual Exam   • Hyperlipidemia         Subjective   Aure Bernard is a 52 y.o. female here for a Annual Visit.     Last Physical Exam: 12/15/2020 Previous physical was performed by  Alia Roe MD  General Health:good  Contraceptive History:none  Nutrition:in general, a \"healthy\" diet  started tom  Exercise Level:regularly 2 times weekly  Sleep:well  Hours of Sleep:7  Libido:poor  Self Skin Exam: monthly  Monthly Breast Self Exam:Performs monthly self breast exam    Pap Smear:  Last Completed Pap Smear          Discontinued - PAP SMEAR  Discontinued    No completion history exists for this topic.             Findings on last pap: patient has had a partial hysterectomy not due to cancer}    Mammogram:   Last Completed Mammogram          Ordered - MAMMOGRAM (Every 2 Years) Ordered on 6/14/2022 01/11/2021  Mammo Screening Digital Tomosynthesis Bilateral With CAD    05/14/2019  SCANNED - MAMMO    05/14/2019  Mammo Diagnostic Digital Tomosynthesis Left With CAD    05/02/2019  SCANNED - MAMMO    05/02/2019  Mammo Screening Bilateral With CAD    Only the first 5 history entries have been loaded, but more history exists.             was done on approximately 01/11/2022 and the result was: Birads II (Benign findings).    Bone Dexa scan: None    Colonoscopy:   Last Completed Colonoscopy          COLORECTAL CANCER SCREENING (COLONOSCOPY - Every 10 Years) Next due on 3/19/2028    03/19/2018  SCANNED - COLONOSCOPY    03/18/2018  COLONOSCOPY (Done)    06/24/2013  SCANNED - COLONOSCOPY             Last colonoscopy was 03/19/2018  repeat in 5 years     Hyperlipidemia  This is a chronic problem. The current episode started more than 1 year ago. Exacerbating diseases include obesity. She has no history of diabetes. Pertinent negatives include no chest pain, leg pain or shortness of breath. Current antihyperlipidemic treatment includes herbal therapy. The current treatment " provides moderate improvement of lipids. Risk factors for coronary artery disease include dyslipidemia and hypertension.   Insect Bite  This is a new problem. The current episode started in the past 7 days. The problem occurs daily. The problem has been unchanged. Pertinent negatives include no abdominal pain, arthralgias, chest pain, chills, coughing, fatigue, fever, headaches, nausea, rash or vomiting. Nothing aggravates the symptoms.        I personally reviewed and updated the patient's allergies, medications, problem list, and past medical, surgical, social, and family history.     Allergies:  No Known Allergies    Social History:  Social History     Socioeconomic History   • Marital status:    Tobacco Use   • Smoking status: Former Smoker     Packs/day: 0.50     Years: 5.00     Pack years: 2.50     Types: Cigarettes     Start date:      Quit date:      Years since quittin.4   • Smokeless tobacco: Never Used   Vaping Use   • Vaping Use: Never used   Substance and Sexual Activity   • Alcohol use: Yes     Alcohol/week: 2.0 standard drinks     Types: 2 Glasses of wine per week   • Drug use: No   • Sexual activity: Defer       Family History:  Family History   Problem Relation Age of Onset   • Colon cancer Maternal Grandfather    • Breast cancer Neg Hx    • Ovarian cancer Neg Hx        Past Medical History :  Active Ambulatory Problems     Diagnosis Date Noted   • Allergic rhinitis due to pollen 2019   • Anxiety 2019   • Convulsions (HCC) 2019   • Daytime somnolence 2019   • Dense breast 2019   • Gastroesophageal reflux disease 2019   • Irritable colon 2019   • Encounter for screening mammogram for malignant neoplasm of breast 2019   • Encounter for annual general medical examination with abnormal findings in adult 2019   • History of tobacco use 2019   • H/O: hysterectomy 12/15/2020   • Acute pain of right shoulder 12/15/2020   •  Screening mammogram, encounter for 12/15/2020   • Menopausal symptom 12/15/2020   • Mixed hyperlipidemia 03/30/2021   • Right elbow joint crepitus 03/30/2021   • Tension headache 03/30/2021   • Acute pain of both knees 06/24/2021   • Polyarthralgia 06/24/2021   • Right sided sciatica 07/02/2021   • RMSF ([place] spotted fever) 07/02/2021   • Other insomnia 07/02/2021   • Grief 06/14/2022   • Tick bite of left wrist 06/14/2022     Resolved Ambulatory Problems     Diagnosis Date Noted   • Acute bilateral low back pain without sciatica 12/05/2019   • Overweight (BMI 25.0-29.9) 12/05/2019   • Opacity of lung on imaging study 12/09/2019     Past Medical History:   Diagnosis Date   • GERD with esophagitis    • Headache    • Seizures (HCC)        Medication List:    Current Outpatient Medications:   •  coenzyme Q10 100 MG capsule, Take 100 mg by mouth Daily., Disp: , Rfl:   •  fexofenadine (ALLEGRA) 180 MG tablet, Take  by mouth Daily., Disp: , Rfl:   •  magnesium chloride ER 64 MG DR tablet, Take  by mouth Daily., Disp: , Rfl:   •  Omega-3 Fatty Acids (fish oil) 1000 MG capsule capsule, Take  by mouth Daily With Breakfast., Disp: , Rfl:   •  Omeprazole 20 MG Tablet Delayed Release Dispersible, Take  by mouth Daily., Disp: , Rfl:   •  polyethylene glycol (MIRALAX) 17 GM/SCOOP powder, Take  by mouth Daily., Disp: , Rfl:   •  doxycycline (MONODOX) 100 MG capsule, Take 1 capsule by mouth 2 (Two) Times a Day., Disp: 20 capsule, Rfl: 0    Past Surgical History:  Past Surgical History:   Procedure Laterality Date   • HYSTERECTOMY         Depression Screen:   PHQ-2/PHQ-9 Depression Screening 6/14/2022   Retired PHQ-9 Total Score -   Retired Total Score -   Little Interest or Pleasure in Doing Things 0-->not at all   Feeling Down, Depressed or Hopeless 1-->several days   PHQ-9: Brief Depression Severity Measure Score 1       Fall Risk Screen:  LARRY Fall Risk Assessment has not been completed.    Review Of Systems:  Review  "of Systems   Constitutional: Negative for activity change, chills, fatigue and fever.   HENT: Negative for ear pain, rhinorrhea, sinus pressure and voice change.    Eyes: Negative for visual disturbance.   Respiratory: Negative for cough and shortness of breath.    Cardiovascular: Negative for chest pain.   Gastrointestinal: Negative for abdominal pain, diarrhea, nausea and vomiting.   Endocrine: Negative for cold intolerance and heat intolerance.   Genitourinary: Negative for frequency and urgency.   Musculoskeletal: Negative for arthralgias.   Skin: Negative for rash.   Neurological: Negative for syncope.   Hematological: Does not bruise/bleed easily.   Psychiatric/Behavioral: Negative for depressed mood. The patient is not nervous/anxious.        Physical Exam:  Vital Signs:  Visit Vitals  /82 (BP Location: Right arm, Patient Position: Sitting, Cuff Size: Large Adult)   Pulse 79   Temp 97.1 °F (36.2 °C)   Resp 18   Ht 154.9 cm (61\")   Wt 57.6 kg (127 lb)   SpO2 98%   BMI 24.00 kg/m²       Body mass index is 24 kg/m².      Result Review :            Physical Exam  Vitals reviewed.   Constitutional:       Appearance: Normal appearance. She is well-developed.   HENT:      Head: Normocephalic and atraumatic.   Eyes:      General:         Right eye: No discharge.         Left eye: No discharge.   Cardiovascular:      Rate and Rhythm: Normal rate and regular rhythm.      Heart sounds: Normal heart sounds. No murmur heard.    No friction rub. No gallop.   Pulmonary:      Effort: Pulmonary effort is normal. No respiratory distress.      Breath sounds: Normal breath sounds. No wheezing or rales.   Skin:     General: Skin is warm and dry.      Findings: No rash.   Neurological:      Mental Status: She is alert and oriented to person, place, and time.      Coordination: Coordination normal.      Gait: Gait normal.   Psychiatric:         Behavior: Behavior is cooperative.           Assessment and Plan:  Problem List " Items Addressed This Visit        Cardiac and Vasculature    Mixed hyperlipidemia    Overview     Start co q 10 100mg and omega 3 1000mg. Eat more tuna, salmon and walnuts. Recheck 3 months  \           Current Assessment & Plan     She is losing weight. She is doing great.            Relevant Orders    Comprehensive Metabolic Panel (Completed)    Lipid Panel With / Chol / HDL Ratio (Completed)       Genitourinary and Reproductive     Dense breast    H/O: hysterectomy    Overview     Not for cancer, ovaries remain           Screening mammogram, encounter for    Relevant Orders    Mammo Screening Digital Tomosynthesis Bilateral With CAD       Health Encounters    Encounter for annual general medical examination with abnormal findings in adult - Primary    Overview     chart reviewed, updates made  screening UTD           Relevant Orders    POCT urinalysis dipstick, manual (Completed)    CBC & Differential (Completed)    TSH (Completed)       Mental Health    Grief    Current Assessment & Plan     She lost her daughter in law to covid  She declines medication at present. She has people to talk to. She will let me know if she needs anything.               Other    Tick bite of left wrist    Current Assessment & Plan     And right inner thigh           Relevant Medications    doxycycline (MONODOX) 100 MG capsule    Other Relevant Orders    [place] SF (IgG / M) (Completed)    [place] Microti Antibody Panel (Completed)    Ehrlichia Chaffeensis PCR    Lyme Disease Total Antibody With Reflex to Immunoassay (Completed)          BMI is within normal parameters. No other follow-up for BMI required.       An After Visit Summary and PPPS were given to the patient.       Discussed injury prevention, diet and exercise, safe sexual practices, and screening for common diseases. Encouraged use of sunscreen and seatbelts. Discussed timing of  cervical cancer screening. Encouraged monthly self-breast exams, yearly clinical  breast exams, and discussed timing of mammograms. Avoidance of tobacco encouraged. Limitation or avoidance of alcohol encouraged. Recommend yearly dental and eye exams. Also discussed monitoring of blood pressure, lipids.     I wore protective equipment throughout this patient encounter to include mask and eye protection. Hand hygiene was performed before donning protective equipment and after removal when leaving the room.

## 2022-06-14 ENCOUNTER — OFFICE VISIT (OUTPATIENT)
Dept: FAMILY MEDICINE CLINIC | Facility: CLINIC | Age: 52
End: 2022-06-14

## 2022-06-14 VITALS
RESPIRATION RATE: 18 BRPM | HEIGHT: 61 IN | SYSTOLIC BLOOD PRESSURE: 126 MMHG | OXYGEN SATURATION: 98 % | TEMPERATURE: 97.1 F | DIASTOLIC BLOOD PRESSURE: 82 MMHG | BODY MASS INDEX: 23.98 KG/M2 | WEIGHT: 127 LBS | HEART RATE: 79 BPM

## 2022-06-14 DIAGNOSIS — F43.21 GRIEF: ICD-10-CM

## 2022-06-14 DIAGNOSIS — R92.2 DENSE BREAST: ICD-10-CM

## 2022-06-14 DIAGNOSIS — S60.862A TICK BITE OF LEFT WRIST, INITIAL ENCOUNTER: ICD-10-CM

## 2022-06-14 DIAGNOSIS — E78.2 MIXED HYPERLIPIDEMIA: ICD-10-CM
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
## 2022-06-14 DIAGNOSIS — Z00.01 ENCOUNTER FOR ANNUAL GENERAL MEDICAL EXAMINATION WITH ABNORMAL FINDINGS IN ADULT: Primary | ICD-10-CM

## 2022-06-14 DIAGNOSIS — Z12.31 SCREENING MAMMOGRAM, ENCOUNTER FOR: ICD-10-CM

## 2022-06-14 DIAGNOSIS — Z90.710 H/O: HYSTERECTOMY: ICD-10-CM

## 2022-06-14 PROBLEM — E66.3 OVERWEIGHT (BMI 25.0-29.9): Status: RESOLVED | Noted: 2019-12-05 | Resolved: 2022-06-14

## 2022-06-14 LAB
BILIRUB BLD-MCNC: NEGATIVE MG/DL
CLARITY, POC: CLEAR
COLOR UR: YELLOW
GLUCOSE UR STRIP-MCNC: NEGATIVE MG/DL
KETONES UR QL: NEGATIVE
LEUKOCYTE EST, POC: NEGATIVE
NITRITE UR-MCNC: NEGATIVE MG/ML
PH UR: 6.5 [PH] (ref 5–8)
PROT UR STRIP-MCNC: ABNORMAL MG/DL
RBC # UR STRIP: NEGATIVE /UL
SP GR UR: 1.01 (ref 1–1.03)
UROBILINOGEN UR QL: NORMAL

## 2022-06-14 PROCEDURE — 99396 PREV VISIT EST AGE 40-64: CPT | Performed by: FAMILY MEDICINE

## 2022-06-14 PROCEDURE — 81002 URINALYSIS NONAUTO W/O SCOPE: CPT | Performed by: FAMILY MEDICINE

## 2022-06-14 PROCEDURE — 99214 OFFICE O/P EST MOD 30 MIN: CPT | Performed by: FAMILY MEDICINE

## 2022-06-14 RX ORDER — DOXYCYCLINE 100 MG/1
100 CAPSULE ORAL 2 TIMES DAILY
Qty: 20 CAPSULE | Refills: 0 | Status: SHIPPED | OUTPATIENT
Start: 2022-06-14 | End: 2022-06-20

## 2022-06-14 NOTE — ASSESSMENT & PLAN NOTE
She lost her daughter in law to covid  She declines medication at present. She has people to talk to. She will let me know if she needs anything.

## 2022-06-15 LAB
ALBUMIN SERPL-MCNC: 5.2 G/DL (ref 3.8–4.8)
ALBUMIN/GLOB SERPL: 1.9 {RATIO} (ref 1.2–2.2)
ALP SERPL-CCNC: 107 IU/L (ref 44–121)
ALT SERPL-CCNC: 31 IU/L (ref 0–32)
AST SERPL-CCNC: 25 IU/L (ref 0–40)
B BURGDOR IGG+IGM SER QL IA: NEGATIVE
BASOPHILS # BLD AUTO: 0 X10E3/UL (ref 0–0.2)
BASOPHILS NFR BLD AUTO: 1 %
BILIRUB SERPL-MCNC: 0.5 MG/DL (ref 0–1.2)
BUN SERPL-MCNC: 20 MG/DL (ref 6–24)
BUN/CREAT SERPL: 25 (ref 9–23)
CALCIUM SERPL-MCNC: 10.4 MG/DL (ref 8.7–10.2)
CHLORIDE SERPL-SCNC: 102 MMOL/L (ref 96–106)
CHOLEST SERPL-MCNC: 225 MG/DL (ref 100–199)
CHOLEST/HDLC SERPL: 3.3 RATIO (ref 0–4.4)
CO2 SERPL-SCNC: 22 MMOL/L (ref 20–29)
CREAT SERPL-MCNC: 0.8 MG/DL (ref 0.57–1)
EGFRCR SERPLBLD CKD-EPI 2021: 90 ML/MIN/1.73
EOSINOPHIL # BLD AUTO: 0.2 X10E3/UL (ref 0–0.4)
EOSINOPHIL NFR BLD AUTO: 3 %
ERYTHROCYTE [DISTWIDTH] IN BLOOD BY AUTOMATED COUNT: 12.1 % (ref 11.7–15.4)
GLOBULIN SER CALC-MCNC: 2.8 G/DL (ref 1.5–4.5)
GLUCOSE SERPL-MCNC: 98 MG/DL (ref 65–99)
HCT VFR BLD AUTO: 43.1 % (ref 34–46.6)
HDLC SERPL-MCNC: 69 MG/DL
HGB BLD-MCNC: 14 G/DL (ref 11.1–15.9)
IMM GRANULOCYTES # BLD AUTO: 0 X10E3/UL (ref 0–0.1)
IMM GRANULOCYTES NFR BLD AUTO: 0 %
LDLC SERPL CALC-MCNC: 136 MG/DL (ref 0–99)
LYMPHOCYTES # BLD AUTO: 1.4 X10E3/UL (ref 0.7–3.1)
LYMPHOCYTES NFR BLD AUTO: 27 %
MCH RBC QN AUTO: 29.4 PG (ref 26.6–33)
MCHC RBC AUTO-ENTMCNC: 32.5 G/DL (ref 31.5–35.7)
MCV RBC AUTO: 90 FL (ref 79–97)
MONOCYTES # BLD AUTO: 0.3 X10E3/UL (ref 0.1–0.9)
MONOCYTES NFR BLD AUTO: 6 %
NEUTROPHILS # BLD AUTO: 3.3 X10E3/UL (ref 1.4–7)
NEUTROPHILS NFR BLD AUTO: 63 %
PLATELET # BLD AUTO: 288 X10E3/UL (ref 150–450)
POTASSIUM SERPL-SCNC: 4.3 MMOL/L (ref 3.5–5.2)
PROT SERPL-MCNC: 8 G/DL (ref 6–8.5)
RBC # BLD AUTO: 4.77 X10E6/UL (ref 3.77–5.28)
SODIUM SERPL-SCNC: 143 MMOL/L (ref 134–144)
TRIGL SERPL-MCNC: 114 MG/DL (ref 0–149)
TSH SERPL DL<=0.005 MIU/L-ACNC: 2.24 UIU/ML (ref 0.45–4.5)
VLDLC SERPL CALC-MCNC: 20 MG/DL (ref 5–40)
WBC # BLD AUTO: 5.3 X10E3/UL (ref 3.4–10.8)

## 2022-06-16 LAB
BABESIA IGG TITR SER IF: NORMAL {TITER}
BABESIA IGM TITR SER IF: NORMAL {TITER}

## 2022-06-17 NOTE — PROGRESS NOTES
Subjective   Aure Bernard is a 52 y.o. female. Presents to [facility]    Chief Complaint   Patient presents with   • labwork follow up   • Insect Bite       Aure was last seen in office on 2022 for an annual wellness and insect bite. Lab work was ordered on 2022. She is here to discuss the results.     Insect Bite  This is a new problem. The current episode started in the past 7 days. The problem occurs rarely. The problem has been gradually improving. Pertinent negatives include no abdominal pain, anorexia, arthralgias, change in bowel habit, chest pain, chills, congestion, coughing, diaphoresis, fatigue, fever, headaches, joint swelling, myalgias, nausea, neck pain, numbness, rash, sore throat, swollen glands, urinary symptoms, vertigo, visual change, vomiting or weakness. Nothing aggravates the symptoms. She has tried nothing for the symptoms. The treatment provided no relief.        I personally reviewed and updated the patient's allergies, medications, problem list, and past medical, surgical, social, and family history. I have reviewed and confirmed the accuracy of the History of Present Illness and Review of Symptoms as documented by the MA/LPN/RN. Alia Roe MD    Allergies:  No Known Allergies    Social History:  Social History     Socioeconomic History   • Marital status:    Tobacco Use   • Smoking status: Former Smoker     Packs/day: 0.50     Years: 5.00     Pack years: 2.50     Types: Cigarettes     Start date:      Quit date:      Years since quittin.5   • Smokeless tobacco: Never Used   Vaping Use   • Vaping Use: Never used   Substance and Sexual Activity   • Alcohol use: Yes     Alcohol/week: 2.0 standard drinks     Types: 2 Glasses of wine per week   • Drug use: No   • Sexual activity: Defer       Family History:  Family History   Problem Relation Age of Onset   • Colon cancer Maternal Grandfather    • Breast cancer Neg Hx    • Ovarian cancer  Neg Hx        Past Medical History :  Patient Active Problem List   Diagnosis   • Allergic rhinitis due to pollen   • Anxiety   • Convulsions (HCC)   • Daytime somnolence   • Dense breast   • Gastroesophageal reflux disease   • Irritable colon   • Encounter for screening mammogram for malignant neoplasm of breast   • Encounter for annual general medical examination with abnormal findings in adult   • History of tobacco use   • H/O: hysterectomy   • Acute pain of right shoulder   • Screening mammogram, encounter for   • Menopausal symptom   • Mixed hyperlipidemia   • Right elbow joint crepitus   • Tension headache   • Acute pain of both knees   • Polyarthralgia   • Right sided sciatica   • RMSF ([place] spotted fever)   • Other insomnia   • Grief   • Tick bite of left wrist       Medication List:    Current Outpatient Medications:   •  coenzyme Q10 100 MG capsule, Take 100 mg by mouth Daily., Disp: , Rfl:   •  fexofenadine (ALLEGRA) 180 MG tablet, Take  by mouth Daily., Disp: , Rfl:   •  magnesium chloride ER 64 MG DR tablet, Take  by mouth Daily., Disp: , Rfl:   •  Omega-3 Fatty Acids (fish oil) 1000 MG capsule capsule, Take  by mouth Daily With Breakfast., Disp: , Rfl:   •  Omeprazole 20 MG Tablet Delayed Release Dispersible, Take  by mouth Daily., Disp: , Rfl:   •  polyethylene glycol (MIRALAX) 17 GM/SCOOP powder, Take  by mouth Daily., Disp: , Rfl:     Past Surgical History:  Past Surgical History:   Procedure Laterality Date   • HYSTERECTOMY         Review of Systems:  Review of Systems   Constitutional: Negative for chills, diaphoresis, fatigue and fever.   HENT: Negative for congestion, sore throat and swollen glands.    Respiratory: Negative for cough.    Cardiovascular: Negative for chest pain.   Gastrointestinal: Negative for abdominal pain, anorexia, change in bowel habit, nausea and vomiting.   Musculoskeletal: Negative for arthralgias, joint swelling, myalgias and neck pain.   Skin: Negative for  "rash.   Neurological: Negative for vertigo, weakness and numbness.       Physical Exam:  Vital Signs:  Vital Signs:   /80 (BP Location: Right arm, Patient Position: Sitting, Cuff Size: Adult)   Pulse 77   Temp 97.7 °F (36.5 °C) (Temporal)   Resp 18   Ht 154.9 cm (61\")   Wt 58.8 kg (129 lb 9.6 oz)   SpO2 99% Comment: room air  BMI 24.49 kg/m²     Result Review :   The following data was reviewed by: Alia Roe MD on 06/20/2022:            Latest Reference Range & Units 06/14/22 00:00   Glucose 65 - 99 mg/dL 98   Sodium 134 - 144 mmol/L 143   Potassium 3.5 - 5.2 mmol/L 4.3   CO2 20 - 29 mmol/L 22   Chloride 96 - 106 mmol/L 102   Creatinine 0.57 - 1.00 mg/dL 0.80   BUN 6 - 24 mg/dL 20   BUN/Creatinine Ratio 9 - 23  25 (H)   Calcium 8.7 - 10.2 mg/dL 10.4 (H)   EGFR Result >59 mL/min/1.73 90   Alkaline Phosphatase 44 - 121 IU/L 107   Total Protein 6.0 - 8.5 g/dL 8.0   ALT (SGPT) 0 - 32 IU/L 31   AST (SGOT) 0 - 40 IU/L 25   Total Bilirubin 0.0 - 1.2 mg/dL 0.5   Albumin 3.8 - 4.8 g/dL 5.2 (H)   A/G Ratio 1.2 - 2.2  1.9   TSH Baseline 0.450 - 4.500 uIU/mL 2.240   Total Cholesterol 100 - 199 mg/dL 225 (H)   HDL Cholesterol >39 mg/dL 69   LDL Cholesterol  0 - 99 mg/dL 136 (H)   Triglycerides 0 - 149 mg/dL 114   Chol/HDL Ratio 0.0 - 4.4 ratio 3.3 [1]   VLDL Cholesterol Alonso 5 - 40 mg/dL 20   Globulin 1.5 - 4.5 g/dL 2.8   WBC 3.4 - 10.8 x10E3/uL 5.3   RBC 3.77 - 5.28 x10E6/uL 4.77   Hemoglobin 11.1 - 15.9 g/dL 14.0   Hematocrit 34.0 - 46.6 % 43.1   RDW 11.7 - 15.4 % 12.1   MCV 79 - 97 fL 90   MCH 26.6 - 33.0 pg 29.4   MCHC 31.5 - 35.7 g/dL 32.5   Platelets 150 - 450 x10E3/uL 288   Neutrophil Rel % Not Estab. % 63   Lymphocyte Rel % Not Estab. % 27   Monocyte Rel % Not Estab. % 6   Eosinophil Rel % Not Estab. % 3   Basophil Rel % Not Estab. % 1   Immature Granulocyte Rel % Not Estab. % 0   Neutrophils Absolute 1.4 - 7.0 x10E3/uL 3.3   Lymphocytes Absolute 0.7 - 3.1 x10E3/uL 1.4   Monocytes Absolute 0.1 - 0.9 " x10E3/uL 0.3   Eosinophils Absolute 0.0 - 0.4 x10E3/uL 0.2   Basophils Absolute 0.0 - 0.2 x10E3/uL 0.0   Immature Grans, Absolute 0.0 - 0.1 x10E3/uL 0.0   Babesia microti IgG Neg:<1:10  <1:10 [2]   Babesia microti IgM Neg:<1:10  <1:10   RMSF IgG Negative  Negative   ABDIAS [facility] SPOTTED FEVER IGM 0.00 - 0.89 index 1.12 (H) [3]   (H): Data is abnormally high  [1]                                   T. Chol/HDL Ratio                                              Men  Women                                1/2 Avg.Risk  3.4    3.3                                    Avg.Risk  5.0    4.4                                 2X Avg.Risk  9.6    7.1                                 3X Avg.Risk 23.4   11.0    [2] This test was developed and its performance characteristics determined  by [employer]. It has not been cleared or approved by the  U.[place]. Food and Drug Administration. The FDA has determined that such  clearance or approval is not necessary. This test is used for clinical  purposes. It should not be regarded as investigational or research.    [3]                                  Negative        <0.90                                   Equivocal 0.90 - 1.10                                   Positive        >1.10    Physical Exam  Vitals reviewed.   Constitutional:       General: She is not in acute distress.     Appearance: Normal appearance. She is well-developed. She is not diaphoretic.   HENT:      Head: Normocephalic and atraumatic.   Eyes:      General:         Right eye: No discharge.         Left eye: No discharge.   Cardiovascular:      Rate and Rhythm: Normal rate and regular rhythm.      Heart sounds: Normal heart sounds. No murmur heard.    No friction rub. No gallop.   Pulmonary:      Effort: Pulmonary effort is normal. No respiratory distress.      Breath sounds: Normal breath sounds. No wheezing or rales.   Musculoskeletal:         General: No deformity.      Lumbar back: No deformity, spasms or tenderness.  Normal range of motion.   Skin:     General: Skin is warm and dry.      Findings: No rash.   Neurological:      Mental Status: She is alert and oriented to person, place, and time.      Motor: No weakness or abnormal muscle tone.      Coordination: Coordination normal.      Gait: Gait normal.      Deep Tendon Reflexes: Reflexes normal.   Psychiatric:         Behavior: Behavior is cooperative.         Assessment and Plan:  Problems Addressed this Visit        Musculoskeletal and Injuries    Right sided sciatica     A little better    Ice three times a day for about 10-15 minutes for the first 1-2 days. Then may alternate heat and ice. Better body mechanics discussed. Home exercises discussed and hand out given. Discussed nsaids and if they can be taken. May need imaging and or PT if persists.  Discussed red flags, if there is severe pain, fever with pain, loss of movement in one or both legs pain, numbness in groin or both legs, trouble urinating or defecating on oneself, then patient is to go to the ER.                 Tobacco    History of tobacco use       Other    RMSF ([place] spotted fever)     In the past. She says she is doing ok she says. She declines the doxycycline.            Tick bite of left wrist - Primary      Diagnoses       Codes Comments    Tick bite of left wrist, initial encounter    -  Primary ICD-10-CM: S60.862A, W57.XXXA  ICD-9-CM: 913.4, E906.4     History of tobacco use     ICD-10-CM: Z87.891  ICD-9-CM: V15.82     RMSF ([place] spotted fever)     ICD-10-CM: A77.0  ICD-9-CM: 082.0     Right sided sciatica     ICD-10-CM: M54.31  ICD-9-CM: 724.3            BMI is within normal parameters. No other follow-up for BMI required.      An After Visit Summary and PPPS were given to the patient.       I wore protective equipment throughout this patient encounter to include mask and eyewear. Hand hygiene was performed before donning protective equipment and after removal when leaving the  room.

## 2022-06-18 LAB
R RICKETTSI IGG SER QL IA: NEGATIVE
R RICKETTSI IGM SER-ACNC: 1.12 INDEX (ref 0–0.89)

## 2022-06-20 ENCOUNTER — OFFICE VISIT (OUTPATIENT)
Dept: FAMILY MEDICINE CLINIC | Facility: CLINIC | Age: 52
End: 2022-06-20

## 2022-06-20 VITALS
RESPIRATION RATE: 18 BRPM | HEIGHT: 61 IN | BODY MASS INDEX: 24.47 KG/M2 | WEIGHT: 129.6 LBS | OXYGEN SATURATION: 99 % | TEMPERATURE: 97.7 F | DIASTOLIC BLOOD PRESSURE: 80 MMHG | HEART RATE: 77 BPM | SYSTOLIC BLOOD PRESSURE: 108 MMHG
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
## 2022-06-20 DIAGNOSIS — M54.31 RIGHT SIDED SCIATICA: ICD-10-CM

## 2022-06-20 DIAGNOSIS — S60.862A TICK BITE OF LEFT WRIST, INITIAL ENCOUNTER: Primary | ICD-10-CM

## 2022-06-20 DIAGNOSIS — A77.0 RMSF (ROCKY MOUNTAIN SPOTTED FEVER): ICD-10-CM

## 2022-06-20 DIAGNOSIS — Z87.891 HISTORY OF TOBACCO USE: ICD-10-CM

## 2022-06-20 PROCEDURE — 99213 OFFICE O/P EST LOW 20 MIN: CPT | Performed by: FAMILY MEDICINE

## 2022-06-26 NOTE — ASSESSMENT & PLAN NOTE
A little better    Ice three times a day for about 10-15 minutes for the first 1-2 days. Then may alternate heat and ice. Better body mechanics discussed. Home exercises discussed and hand out given. Discussed nsaids and if they can be taken. May need imaging and or PT if persists.  Discussed red flags, if there is severe pain, fever with pain, loss of movement in one or both legs pain, numbness in groin or both legs, trouble urinating or defecating on oneself, then patient is to go to the ER.

## 2022-06-27 LAB — E CHAFFEENSIS DNA BLD QL NAA+PROBE: NEGATIVE

## 2022-08-04 ENCOUNTER — HOSPITAL ENCOUNTER (OUTPATIENT)
Dept: MAMMOGRAPHY | Facility: HOSPITAL | Age: 52
Discharge: HOME | End: 2022-08-04
Admitting: FAMILY MEDICINE

## 2022-08-04 DIAGNOSIS — Z12.31 SCREENING MAMMOGRAM, ENCOUNTER FOR: ICD-10-CM

## 2022-08-04 PROCEDURE — 77063 BREAST TOMOSYNTHESIS BI: CPT

## 2022-08-04 PROCEDURE — 77067 SCR MAMMO BI INCL CAD: CPT

## 2022-08-11 ENCOUNTER — TELEPHONE (OUTPATIENT)
Dept: FAMILY MEDICINE CLINIC | Facility: CLINIC | Age: 52
End: 2022-08-11

## 2022-08-11 NOTE — TELEPHONE ENCOUNTER
Called patient to do breast cancer risk assessment. Was unable to get a hold of patient LVM to call back.

## 2022-08-11 NOTE — TELEPHONE ENCOUNTER
----- Message from Alia Roe MD sent at 8/10/2022  1:54 PM EDT -----  Her breasts are dense. She needs a risk assessment

## 2022-08-12 ENCOUNTER — TELEPHONE (OUTPATIENT)
Dept: FAMILY MEDICINE CLINIC | Facility: CLINIC | Age: 52
End: 2022-08-12

## 2022-08-15 ENCOUNTER — TELEPHONE (OUTPATIENT)
Dept: FAMILY MEDICINE CLINIC | Facility: CLINIC | Age: 52
End: 2022-08-15

## 2022-08-15 NOTE — TELEPHONE ENCOUNTER
Called pt and let her know.   Risk Assessment:    5 year risk: 0.6%   Demo: 1.3%   lifetime risk: 6.0%   Demo:  11.2%

## 2022-09-19 ENCOUNTER — TELEPHONE (OUTPATIENT)
Dept: FAMILY MEDICINE CLINIC | Facility: CLINIC | Age: 52
End: 2022-09-19

## 2022-09-19 NOTE — TELEPHONE ENCOUNTER
Caller: Aure Bernard    Relationship: Self    Best call back number: [phone]    What form or medical record are you requesting: RETURN TO WORK NOTE    Who is requesting this form or medical record from you: PATIENT FOR HER EMPLOYER    How would you like to receive the form or medical records (pick-up, mail, fax): CLEMENTE.    Timeframe paperwork needed: AS SOON AS POSSIBLE    Additional notes: PATIENT AND HER FAMILY CAME HOME FROM [place] WITH SYMPTOMS OF COVID , TESTED ON Thursday 9/15 WITH A HOME TEST THAT CAME UP POSITIVE.  PLEASE ADVISE PATIENT IF SHE NEEDS TO BE SEEN.  SYMPTOMS: SINUS PRESSURE AND PAIN, CONGESTION, COUGH AND SHE STILL HAS TASTE AND SMELL.

## 2022-09-21 ENCOUNTER — TELEPHONE (OUTPATIENT)
Dept: FAMILY MEDICINE CLINIC | Facility: CLINIC | Age: 52
End: 2022-09-21

## 2022-09-21 NOTE — TELEPHONE ENCOUNTER
----- Message from Keke Lloyd sent at 9/20/2022  9:40 AM EDT -----  Regarding: FW: note    ----- Message -----  From: Aure Bernard  Sent: 9/19/2022   6:01 PM EDT  To: Neida Ortiz  Clinical Pool  Subject: note                                             I was planning on going back to work on Wednesday 9/21.
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
## 2023-12-22 NOTE — PROGRESS NOTES
"  Chief Complaint   Patient presents with    Annual Exam    Hyperlipidemia         Subjective   Aure Bernard is a 53 y.o. female here for a Annual Visit.     Last Physical Exam: 06/14/22 Previous physical was performed by  Alia Roe MD  General Health:fair  Contraceptive History:none  Nutrition:in general, an \"unhealthy\" diet  Exercise Level:irregularly  Sleep:poorly  Hours of Sleep:7  Libido:fair  Self Skin Exam: monthly  Monthly Breast Self Exam:Performs monthly self breast exam    Pap Smear:  Last Completed Pap Smear            Discontinued - PAP SMEAR  Discontinued      No completion history exists for this topic.                 Findings on last pap: approximate date 2022 and was normal}    Mammogram:   Last Completed Mammogram            Scheduled - MAMMOGRAM (Every 2 Years) Scheduled for 1/11/2024 08/04/2022  Mammo Screening Digital Tomosynthesis Bilateral With CAD    01/11/2021  Mammo Screening Digital Tomosynthesis Bilateral With CAD    05/14/2019  SCANNED - MAMMO    05/14/2019  Mammo Diagnostic Digital Tomosynthesis Left With CAD    05/02/2019  SCANNED - MAMMO    Only the first 5 history entries have been loaded, but more history exists.                 was done on approximately 08/04/22 and the result was: Birads I (Normal).    Bone Dexa scan: None    Colonoscopy:   Last Completed Colonoscopy       This patient has no relevant Health Maintenance data.         Last colonoscopy was 03/19/2018 with Tubular Adenoma results repeat in 5 years          I personally reviewed and updated the patient's allergies, medications, problem list, and past medical, surgical, social, and family history.     Allergies:  No Known Allergies    Social History:  Social History     Socioeconomic History    Marital status:    Tobacco Use    Smoking status: Former     Packs/day: 0.50     Years: 5.00     Additional pack years: 0.00     Total pack years: 2.50     Types: Cigarettes     Start date: 1988     Quit " date: 1993     Years since quittin.0    Smokeless tobacco: Never   Vaping Use    Vaping Use: Never used   Substance and Sexual Activity    Alcohol use: Yes     Alcohol/week: 2.0 standard drinks of alcohol     Types: 2 Glasses of wine per week    Drug use: No    Sexual activity: Defer       Family History:  Family History   Problem Relation Age of Onset    Colon cancer Maternal Grandfather     Breast cancer Neg Hx     Ovarian cancer Neg Hx        Past Medical History :  Active Ambulatory Problems     Diagnosis Date Noted    Allergic rhinitis due to pollen 2019    Anxiety 2019    Convulsions 2019    Daytime somnolence 2019    Dense breast 2019    Gastroesophageal reflux disease 2019    Irritable colon 2019    Encounter for screening mammogram for malignant neoplasm of breast 2019    Encounter for annual general medical examination with abnormal findings in adult 2019    History of tobacco use 2019    H/O: hysterectomy 12/15/2020    Screening mammogram, encounter for 12/15/2020    Menopausal symptom 12/15/2020    Mixed hyperlipidemia 2021    Right elbow joint crepitus 2021    Tension headache 2021    Acute pain of both knees 2021    Polyarthralgia 2021    Right sided sciatica 2021    RMSF ([place] spotted fever) 2021    Other insomnia 2021    Grief 2022    Tick bite of left wrist 2022    Hiatal hernia 2023    Acute pain of left shoulder 2023    Biceps tendinitis of left upper extremity 2023    Scalp psoriasis 2023     Resolved Ambulatory Problems     Diagnosis Date Noted    Acute bilateral low back pain without sciatica 2019    Overweight (BMI 25.0-29.9) 2019    Opacity of lung on imaging study 2019    Acute pain of right shoulder 12/15/2020     Past Medical History:   Diagnosis Date    GERD with esophagitis     Headache     Seizures   "      Medication List:    Current Outpatient Medications:     ASHWAGANDHA PO, Take  by mouth., Disp: , Rfl:     coenzyme Q10 100 MG capsule, Take 1 capsule by mouth Daily., Disp: , Rfl:     fexofenadine (ALLEGRA) 180 MG tablet, Take  by mouth Daily., Disp: , Rfl:     magnesium chloride ER 64 MG DR tablet, Take  by mouth Daily., Disp: , Rfl:     Omega-3 Fatty Acids (fish oil) 1000 MG capsule capsule, Take  by mouth Daily With Breakfast., Disp: , Rfl:     polyethylene glycol (MIRALAX) 17 GM/SCOOP powder, Take  by mouth Daily., Disp: , Rfl:     busPIRone (BUSPAR) 7.5 MG tablet, Take 1 tablet by mouth Take As Directed. 1 daily for 7 days, then BID., Disp: 53 tablet, Rfl: 1    omeprazole (priLOSEC) 40 MG capsule, Take 1 capsule by mouth Daily., Disp: 30 capsule, Rfl: 12    predniSONE (DELTASONE) 5 MG tablet, Take 1 tablet by mouth Daily. 40mg x 3 days, 20mg x 3 days, 10mg x 3 days, 5mg x 3 days, Disp: 45 tablet, Rfl: 0    triamcinolone (KENALOG) 0.1 % ointment, Apply 1 application  topically to the appropriate area as directed 2 (Two) Times a Day., Disp: 30 g, Rfl: 3    Past Surgical History:  Past Surgical History:   Procedure Laterality Date    HYSTERECTOMY      bleeding, pain. no cancer       Depression Screen:       12/29/2023     1:15 PM   PHQ-2/PHQ-9 Depression Screening   Little Interest or Pleasure in Doing Things 0-->not at all   Feeling Down, Depressed or Hopeless 0-->not at all   PHQ-9: Brief Depression Severity Measure Score 0       [facility] Risk Screen:  [facility]        Physical Exam:  Vital Signs:  Visit Vitals  /84 (BP Location: Right arm, Patient Position: Sitting, Cuff Size: Adult)   Pulse 90   Temp 97.1 °F (36.2 °C) (Temporal)   Resp 17   Ht 154.9 cm (61\")   Wt 64.3 kg (141 lb 12.8 oz)   SpO2 98% Comment: room air   BMI 26.79 kg/m²       Body mass index is 26.79 kg/m².      Result Review :                  Assessment and Plan:  Problem List Items Addressed " This Visit          Cardiac and Vasculature    Mixed hyperlipidemia    Overview     Start co q 10 100mg and omega 3 1000mg. Eat more tuna, salmon and walnuts. Recheck 3 months  \         Relevant Orders    Lipid Panel With / Chol / HDL Ratio       Gastrointestinal Abdominal     Gastroesophageal reflux disease    Current Assessment & Plan     Worse  Limit tobacco, alcohol, caffeine, chocalate, citrus fruits, recumbency after meals and large portions. Discussed link between PPI's and increased risk of hip, wrist, and spine fractures           Relevant Medications    omeprazole (priLOSEC) 40 MG capsule       Health Encounters    Encounter for annual general medical examination with abnormal findings in adult - Primary    Overview     chart reviewed, updates made  screening UTD         Relevant Orders    POCT urinalysis dipstick, manual (Completed)    Comprehensive Metabolic Panel    CBC & Differential    TSH       Mental Health    Anxiety    Current Assessment & Plan     Worse  Increase buspar  Diagnosis, treatment and and course discussed. Potential side effects discussed. Return if there is worsening or persistence of symptoms.            Relevant Medications    busPIRone (BUSPAR) 7.5 MG tablet       Musculoskeletal and Injuries    Acute pain of left shoulder    Relevant Orders    XR Shoulder 2+ View Left (Completed)    Biceps tendinitis of left upper extremity    Current Assessment & Plan     Diagnosis, treatment and and course discussed. Potential side effects discussed. Return if there is worsening or persistence of symptoms.   Discussed risks of steroids: hyperglycemia, osteoporosis, avascular necrosis, anxiety, insomnia and cataracts. Patient states understanding           Relevant Medications    predniSONE (DELTASONE) 5 MG tablet    Other Relevant Orders    Ambulatory Referral to Physical Therapy Evaluate and treat (Completed)       Skin    Scalp psoriasis    Current Assessment & Plan     Steroid cream sent          Relevant Medications    predniSONE (DELTASONE) 5 MG tablet    triamcinolone (KENALOG) 0.1 % ointment       Tobacco    History of tobacco use     Other Visit Diagnoses       Encounter for screening mammogram for breast cancer        Relevant Orders    Mammo Screening Digital Tomosynthesis Bilateral With CAD            BMI is >= 25 and <30. (Overweight) The following options were offered after discussion;: weight loss educational material (shared in after visit summary), exercise counseling/recommendations, and nutrition counseling/recommendations         An After Visit Summary and PPPS were given to the patient.       Discussed injury prevention, diet and exercise, safe sexual practices, and screening for common diseases. Encouraged use of sunscreen and seatbelts. Discussed timing of  cervical cancer screening. Encouraged monthly self-breast exams, yearly clinical breast exams, and discussed timing of mammograms. Avoidance of tobacco encouraged. Limitation or avoidance of alcohol encouraged. Recommend yearly dental and eye exams. Also discussed monitoring of blood pressure, lipids.         +++++E/M portion medically necessary secondary to new or uncontrolled chronic problem+++++++    Subjective   Aure Bernard is here for:    Chief Complaint   Patient presents with    Annual Exam    Hyperlipidemia       Hyperlipidemia  This is a chronic problem. The current episode started more than 1 year ago. She has no history of diabetes or obesity. Pertinent negatives include no chest pain or shortness of breath. Current antihyperlipidemic treatment includes herbal therapy. The current treatment provides moderate improvement of lipids. Risk factors for coronary artery disease include dyslipidemia and post-menopausal.         Physical Exam:  Review of Systems   Respiratory:  Negative for shortness of breath.    Cardiovascular:  Negative for chest pain.        Physical Exam  Vitals and nursing note reviewed. Exam conducted  with a chaperone present.   Constitutional:       General: She is not in acute distress.     Appearance: She is well-developed. She is not diaphoretic.   HENT:      Head: Normocephalic and atraumatic.      Right Ear: Tympanic membrane and external ear normal.      Left Ear: Tympanic membrane and external ear normal.      Nose: Nose normal.      Mouth/Throat:      Pharynx: No oropharyngeal exudate.   Eyes:      General: No scleral icterus.        Right eye: No discharge.         Left eye: No discharge.      Conjunctiva/sclera: Conjunctivae normal.      Pupils: Pupils are equal, round, and reactive to light.   Neck:      Thyroid: No thyromegaly.      Trachea: No tracheal deviation.   Cardiovascular:      Rate and Rhythm: Normal rate and regular rhythm.      Heart sounds: Normal heart sounds. No murmur heard.     No friction rub. No gallop.   Pulmonary:      Effort: Pulmonary effort is normal. No respiratory distress.      Breath sounds: Normal breath sounds. No stridor. No wheezing or rales.   Chest:   Breasts:     Right: No swelling, bleeding, inverted nipple, mass, nipple discharge, skin change or tenderness.      Left: No swelling, bleeding, inverted nipple, mass, nipple discharge, skin change or tenderness.   Abdominal:      General: Bowel sounds are normal. There is no distension.      Palpations: Abdomen is soft. There is no mass.      Tenderness: There is no abdominal tenderness. There is no guarding or rebound.   Musculoskeletal:         General: No deformity. Normal range of motion.      Left upper arm: Tenderness present.        Arms:       Cervical back: Normal range of motion and neck supple.   Lymphadenopathy:      Cervical: No cervical adenopathy.      Upper Body:      Right upper body: No axillary adenopathy.      Left upper body: No axillary adenopathy.   Skin:     General: Skin is warm and dry.      Capillary Refill: Capillary refill takes less than 2 seconds.      Coloration: Skin is not pale.       Findings: No erythema or rash.   Neurological:      Mental Status: She is alert and oriented to person, place, and time.      Cranial Nerves: No cranial nerve deficit.      Sensory: No sensory deficit.      Motor: No tremor, atrophy or abnormal muscle tone.      Coordination: Coordination normal.      Gait: Gait normal.      Deep Tendon Reflexes: Reflexes are normal and symmetric. Reflexes normal.   Psychiatric:         Behavior: Behavior normal.         Thought Content: Thought content normal.         Cognition and Memory: Memory is not impaired. She does not exhibit impaired recent memory or impaired remote memory.         Judgment: Judgment normal.         Assessment and Plan:  Problem List Items Addressed This Visit          Cardiac and Vasculature    Mixed hyperlipidemia    Overview     Start co q 10 100mg and omega 3 1000mg. Eat more tuna, salmon and walnuts. Recheck 3 months  \         Relevant Orders    Lipid Panel With / Chol / HDL Ratio       Gastrointestinal Abdominal     Gastroesophageal reflux disease    Current Assessment & Plan     Worse  Limit tobacco, alcohol, caffeine, chocalate, citrus fruits, recumbency after meals and large portions. Discussed link between PPI's and increased risk of hip, wrist, and spine fractures           Relevant Medications    omeprazole (priLOSEC) 40 MG capsule       Health Encounters    Encounter for annual general medical examination with abnormal findings in adult - Primary    Overview     chart reviewed, updates made  screening UTD         Relevant Orders    POCT urinalysis dipstick, manual (Completed)    Comprehensive Metabolic Panel    CBC & Differential    TSH       Mental Health    Anxiety    Current Assessment & Plan     Worse  Increase buspar  Diagnosis, treatment and and course discussed. Potential side effects discussed. Return if there is worsening or persistence of symptoms.            Relevant Medications    busPIRone (BUSPAR) 7.5 MG tablet        Musculoskeletal and Injuries    Acute pain of left shoulder    Relevant Orders    XR Shoulder 2+ View Left (Completed)    Biceps tendinitis of left upper extremity    Current Assessment & Plan     Diagnosis, treatment and and course discussed. Potential side effects discussed. Return if there is worsening or persistence of symptoms.   Discussed risks of steroids: hyperglycemia, osteoporosis, avascular necrosis, anxiety, insomnia and cataracts. Patient states understanding           Relevant Medications    predniSONE (DELTASONE) 5 MG tablet    Other Relevant Orders    Ambulatory Referral to Physical Therapy Evaluate and treat (Completed)       Skin    Scalp psoriasis    Current Assessment & Plan     Steroid cream sent         Relevant Medications    predniSONE (DELTASONE) 5 MG tablet    triamcinolone (KENALOG) 0.1 % ointment       Tobacco    History of tobacco use     Other Visit Diagnoses       Encounter for screening mammogram for breast cancer        Relevant Orders    Mammo Screening Digital Tomosynthesis Bilateral With CAD

## 2023-12-29 ENCOUNTER — HOSPITAL ENCOUNTER (OUTPATIENT)
Dept: GENERAL RADIOLOGY | Facility: HOSPITAL | Age: 53
Discharge: HOME | End: 2023-12-29
Admitting: FAMILY MEDICINE
Payer: COMMERCIAL

## 2023-12-29 ENCOUNTER — OFFICE VISIT (OUTPATIENT)
Dept: FAMILY MEDICINE CLINIC | Facility: CLINIC | Age: 53
End: 2023-12-29
Payer: COMMERCIAL

## 2023-12-29 VITALS
HEART RATE: 90 BPM | OXYGEN SATURATION: 98 % | BODY MASS INDEX: 26.77 KG/M2 | HEIGHT: 61 IN | RESPIRATION RATE: 17 BRPM | SYSTOLIC BLOOD PRESSURE: 128 MMHG | TEMPERATURE: 97.1 F | DIASTOLIC BLOOD PRESSURE: 84 MMHG | WEIGHT: 141.8 LBS

## 2023-12-29 DIAGNOSIS — F41.9 ANXIETY: ICD-10-CM

## 2023-12-29 DIAGNOSIS — Z00.01 ENCOUNTER FOR ANNUAL GENERAL MEDICAL EXAMINATION WITH ABNORMAL FINDINGS IN ADULT: Primary | ICD-10-CM

## 2023-12-29 DIAGNOSIS — M75.22 BICEPS TENDINITIS OF LEFT UPPER EXTREMITY: ICD-10-CM

## 2023-12-29 DIAGNOSIS — Z12.31 ENCOUNTER FOR SCREENING MAMMOGRAM FOR BREAST CANCER: ICD-10-CM

## 2023-12-29 DIAGNOSIS — L40.9 SCALP PSORIASIS: ICD-10-CM

## 2023-12-29 DIAGNOSIS — M25.512 ACUTE PAIN OF LEFT SHOULDER: ICD-10-CM

## 2023-12-29 DIAGNOSIS — E78.2 MIXED HYPERLIPIDEMIA: ICD-10-CM

## 2023-12-29 DIAGNOSIS — K21.9 GASTROESOPHAGEAL REFLUX DISEASE, UNSPECIFIED WHETHER ESOPHAGITIS PRESENT: ICD-10-CM

## 2023-12-29 DIAGNOSIS — Z87.891 HISTORY OF TOBACCO USE: ICD-10-CM

## 2023-12-29 PROBLEM — M25.511 ACUTE PAIN OF RIGHT SHOULDER: Status: RESOLVED | Noted: 2020-12-15 | Resolved: 2023-12-29

## 2023-12-29 PROBLEM — K44.9 HIATAL HERNIA: Status: ACTIVE | Noted: 2023-12-29

## 2023-12-29 LAB
BILIRUB BLD-MCNC: NEGATIVE MG/DL
CLARITY, POC: CLEAR
COLOR UR: YELLOW
GLUCOSE UR STRIP-MCNC: NEGATIVE MG/DL
KETONES UR QL: NEGATIVE
LEUKOCYTE EST, POC: NEGATIVE
NITRITE UR-MCNC: NEGATIVE MG/ML
PH UR: 6 [PH] (ref 5–8)
PROT UR STRIP-MCNC: NEGATIVE MG/DL
RBC # UR STRIP: NEGATIVE /UL
SP GR UR: 1.01 (ref 1–1.03)
UROBILINOGEN UR QL: NORMAL

## 2023-12-29 PROCEDURE — 73030 X-RAY EXAM OF SHOULDER: CPT

## 2023-12-29 RX ORDER — TRIAMCINOLONE ACETONIDE 1 MG/G
1 OINTMENT TOPICAL 2 TIMES DAILY
Qty: 30 G | Refills: 3 | Status: SHIPPED | OUTPATIENT
Start: 2023-12-29

## 2023-12-29 RX ORDER — BUSPIRONE HYDROCHLORIDE 7.5 MG/1
7.5 TABLET ORAL TAKE AS DIRECTED
Qty: 53 TABLET | Refills: 1 | Status: SHIPPED | OUTPATIENT
Start: 2023-12-29

## 2023-12-29 RX ORDER — PREDNISONE 5 MG/1
5 TABLET ORAL DAILY
Qty: 45 TABLET | Refills: 0 | Status: SHIPPED | OUTPATIENT
Start: 2023-12-29

## 2023-12-29 RX ORDER — OMEPRAZOLE 40 MG/1
40 CAPSULE, DELAYED RELEASE ORAL DAILY
Qty: 30 CAPSULE | Refills: 12 | Status: SHIPPED | OUTPATIENT
Start: 2023-12-29

## 2023-12-29 NOTE — ASSESSMENT & PLAN NOTE
Worse  Limit tobacco, alcohol, caffeine, chocalate, citrus fruits, recumbency after meals and large portions. Discussed link between PPI's and increased risk of hip, wrist, and spine fractures

## 2023-12-31 NOTE — ASSESSMENT & PLAN NOTE
Worse  Increase buspar  Diagnosis, treatment and and course discussed. Potential side effects discussed. Return if there is worsening or persistence of symptoms.

## 2023-12-31 NOTE — ASSESSMENT & PLAN NOTE
Diagnosis, treatment and and course discussed. Potential side effects discussed. Return if there is worsening or persistence of symptoms.   Discussed risks of steroids: hyperglycemia, osteoporosis, avascular necrosis, anxiety, insomnia and cataracts. Patient states understanding

## 2024-01-11 ENCOUNTER — HOSPITAL ENCOUNTER (OUTPATIENT)
Dept: MAMMOGRAPHY | Facility: HOSPITAL | Age: 54
Discharge: HOME | End: 2024-01-11
Admitting: FAMILY MEDICINE
Payer: COMMERCIAL

## 2024-01-11 DIAGNOSIS — Z12.31 ENCOUNTER FOR SCREENING MAMMOGRAM FOR BREAST CANCER: ICD-10-CM

## 2024-01-11 PROCEDURE — 77063 BREAST TOMOSYNTHESIS BI: CPT

## 2024-01-11 PROCEDURE — 77067 SCR MAMMO BI INCL CAD: CPT

## 2024-01-12 LAB
ALBUMIN SERPL-MCNC: 4.2 G/DL (ref 3.8–4.9)
ALBUMIN/GLOB SERPL: 1.9 {RATIO} (ref 1.2–2.2)
ALP SERPL-CCNC: 74 IU/L (ref 44–121)
ALT SERPL-CCNC: 26 IU/L (ref 0–32)
AST SERPL-CCNC: 15 IU/L (ref 0–40)
BASOPHILS # BLD AUTO: 0 X10E3/UL (ref 0–0.2)
BASOPHILS NFR BLD AUTO: 1 %
BILIRUB SERPL-MCNC: 0.4 MG/DL (ref 0–1.2)
BUN SERPL-MCNC: 19 MG/DL (ref 6–24)
BUN/CREAT SERPL: 24 (ref 9–23)
CALCIUM SERPL-MCNC: 9.2 MG/DL (ref 8.7–10.2)
CHLORIDE SERPL-SCNC: 104 MMOL/L (ref 96–106)
CHOLEST SERPL-MCNC: 222 MG/DL (ref 100–199)
CHOLEST/HDLC SERPL: 3.5 RATIO (ref 0–4.4)
CO2 SERPL-SCNC: 27 MMOL/L (ref 20–29)
CREAT SERPL-MCNC: 0.78 MG/DL (ref 0.57–1)
EGFRCR SERPLBLD CKD-EPI 2021: 92 ML/MIN/1.73
EOSINOPHIL # BLD AUTO: 0.2 X10E3/UL (ref 0–0.4)
EOSINOPHIL NFR BLD AUTO: 4 %
ERYTHROCYTE [DISTWIDTH] IN BLOOD BY AUTOMATED COUNT: 12.3 % (ref 11.7–15.4)
GLOBULIN SER CALC-MCNC: 2.2 G/DL (ref 1.5–4.5)
GLUCOSE SERPL-MCNC: 91 MG/DL (ref 70–99)
HCT VFR BLD AUTO: 40.1 % (ref 34–46.6)
HDLC SERPL-MCNC: 64 MG/DL
HGB BLD-MCNC: 13.8 G/DL (ref 11.1–15.9)
IMM GRANULOCYTES # BLD AUTO: 0 X10E3/UL (ref 0–0.1)
IMM GRANULOCYTES NFR BLD AUTO: 0 %
LDLC SERPL CALC-MCNC: 136 MG/DL (ref 0–99)
LYMPHOCYTES # BLD AUTO: 2.4 X10E3/UL (ref 0.7–3.1)
LYMPHOCYTES NFR BLD AUTO: 43 %
MCH RBC QN AUTO: 30.6 PG (ref 26.6–33)
MCHC RBC AUTO-ENTMCNC: 34.4 G/DL (ref 31.5–35.7)
MCV RBC AUTO: 89 FL (ref 79–97)
MONOCYTES # BLD AUTO: 0.6 X10E3/UL (ref 0.1–0.9)
MONOCYTES NFR BLD AUTO: 10 %
NEUTROPHILS # BLD AUTO: 2.3 X10E3/UL (ref 1.4–7)
NEUTROPHILS NFR BLD AUTO: 42 %
PLATELET # BLD AUTO: 273 X10E3/UL (ref 150–450)
POTASSIUM SERPL-SCNC: 4.4 MMOL/L (ref 3.5–5.2)
PROT SERPL-MCNC: 6.4 G/DL (ref 6–8.5)
RBC # BLD AUTO: 4.51 X10E6/UL (ref 3.77–5.28)
SODIUM SERPL-SCNC: 142 MMOL/L (ref 134–144)
TRIGL SERPL-MCNC: 126 MG/DL (ref 0–149)
TSH SERPL DL<=0.005 MIU/L-ACNC: 1.1 UIU/ML (ref 0.45–4.5)
VLDLC SERPL CALC-MCNC: 22 MG/DL (ref 5–40)
WBC # BLD AUTO: 5.6 X10E3/UL (ref 3.4–10.8)

## 2024-01-22 NOTE — PROGRESS NOTES
Subjective   Aure Bernard is a 53 y.o. female. Presents to [facility]    Chief Complaint   Patient presents with    Anxiety       Anxiety  Presents for follow-up visit. Symptoms include insomnia, irritability, nervous/anxious behavior and restlessness. Patient reports no decreased concentration, depressed mood, excessive worry, hyperventilation, nausea, palpitations or panic. Symptoms occur most days. The severity of symptoms is moderate. The patient sleeps 6 hours per night. The quality of sleep is fair. Nighttime awakenings: occasional.            I personally reviewed and updated the patient's allergies, medications, problem list, and past medical, surgical, social, and family history. I have reviewed and confirmed the accuracy of the History of Present Illness and Review of Symptoms as documented by the MA/LPN/RN. Alia Roe MD    Allergies:  No Known Allergies    Social History:  Social History     Socioeconomic History    Marital status:    Tobacco Use    Smoking status: Former     Packs/day: 0.50     Years: 5.00     Additional pack years: 0.00     Total pack years: 2.50     Types: Cigarettes     Start date:      Quit date:      Years since quittin.1     Passive exposure: Past    Smokeless tobacco: Never   Vaping Use    Vaping Use: Never used   Substance and Sexual Activity    Alcohol use: Yes     Alcohol/week: 2.0 standard drinks of alcohol     Types: 2 Glasses of wine per week    Drug use: No    Sexual activity: Defer       Family History:  Family History   Problem Relation Age of Onset    Colon cancer Maternal Grandfather     Breast cancer Neg Hx     Ovarian cancer Neg Hx        Past Medical History :  Patient Active Problem List   Diagnosis    Allergic rhinitis due to pollen    Anxiety    Convulsions    Daytime somnolence    Dense breast    Gastroesophageal reflux disease    Irritable colon    Encounter for screening mammogram for malignant neoplasm of breast     Encounter for annual general medical examination with abnormal findings in adult    History of tobacco use    H/O: hysterectomy    Screening mammogram, encounter for    Menopausal symptom    Mixed hyperlipidemia    Right elbow joint crepitus    Tension headache    Acute pain of both knees    Polyarthralgia    Right sided sciatica    RMSF ([place] spotted fever)    Other insomnia    Grief    Tick bite of left wrist    Hiatal hernia    Acute pain of left shoulder    Biceps tendinitis of left upper extremity    Scalp psoriasis    Colon cancer screening       Medication List:    Current Outpatient Medications:     ASHWAGANDHA PO, Take  by mouth., Disp: , Rfl:     busPIRone (BUSPAR) 7.5 MG tablet, Take 1 tablet by mouth Take As Directed. 1 daily for 7 days, then BID. (Patient not taking: Reported on 2/1/2024), Disp: 53 tablet, Rfl: 1    coenzyme Q10 100 MG capsule, Take 1 capsule by mouth Daily., Disp: , Rfl:     fexofenadine (ALLEGRA) 180 MG tablet, Take  by mouth Daily., Disp: , Rfl:     magnesium chloride ER 64 MG DR tablet, Take  by mouth Daily., Disp: , Rfl:     Omega-3 Fatty Acids (fish oil) 1000 MG capsule capsule, Take  by mouth Daily With Breakfast., Disp: , Rfl:     omeprazole (priLOSEC) 40 MG capsule, Take 1 capsule by mouth Daily., Disp: 90 capsule, Rfl: 3    polyethylene glycol (MIRALAX) 17 GM/SCOOP powder, Take  by mouth Daily., Disp: , Rfl:     triamcinolone (KENALOG) 0.1 % ointment, Apply 1 application  topically to the appropriate area as directed 2 (Two) Times a Day., Disp: 30 g, Rfl: 3    rosuvastatin (CRESTOR) 5 MG tablet, Take 1 tablet by mouth Every Night. (Patient taking differently: Take 1 tablet by mouth Every Night. QOD), Disp: 90 tablet, Rfl: 3    vitamin B-12 (CYANOCOBALAMIN) 1000 MCG tablet, Take 1 tablet by mouth Daily., Disp: , Rfl:     Past Surgical History:  Past Surgical History:   Procedure Laterality Date    HYSTERECTOMY      bleeding, pain. no cancer         Physical  "Exam:      Vital Signs:    Vitals:    01/26/24 1619   BP: 126/80   Pulse: 90   Resp: 18   Temp: 97.7 °F (36.5 °C)   SpO2: 99%        /80 (BP Location: Right arm, Patient Position: Sitting, Cuff Size: Adult)   Pulse 90   Temp 97.7 °F (36.5 °C) (Temporal)   Resp 18   Ht 154.9 cm (61\")   Wt 64.6 kg (142 lb 6.4 oz)   SpO2 99% Comment: room air  BMI 26.91 kg/m²     Wt Readings from Last 3 Encounters:   02/01/24 64.4 kg (142 lb)   01/26/24 64.6 kg (142 lb 6.4 oz)   12/29/23 64.3 kg (141 lb 12.8 oz)       Result Review :                Physical Exam  Vitals reviewed.   Constitutional:       Appearance: Normal appearance. She is well-developed.   HENT:      Head: Normocephalic and atraumatic.   Eyes:      General:         Right eye: No discharge.         Left eye: No discharge.   Cardiovascular:      Rate and Rhythm: Normal rate and regular rhythm.      Heart sounds: Normal heart sounds. No murmur heard.     No friction rub. No gallop.   Pulmonary:      Effort: Pulmonary effort is normal. No respiratory distress.      Breath sounds: Normal breath sounds. No wheezing or rales.   Skin:     General: Skin is warm and dry.      Findings: No rash.   Neurological:      Mental Status: She is alert and oriented to person, place, and time.      Coordination: Coordination normal.      Gait: Gait normal.   Psychiatric:         Behavior: Behavior is cooperative.         Assessment and Plan:  Problems Addressed this Visit          Cardiac and Vasculature    Mixed hyperlipidemia     Lipid abnormalities are worse  Nutritional counseling was provided. Start crestor  Lipids will be reassessed in 3 months.    Diagnosis, treatment and and course discussed. Potential side effects discussed. Return if there is worsening or persistence of symptoms.            Relevant Medications    rosuvastatin (CRESTOR) 5 MG tablet       Gastrointestinal Abdominal     Gastroesophageal reflux disease     Still there despite omeprazole  She will need " an EGD         Relevant Medications    omeprazole (priLOSEC) 40 MG capsule    Other Relevant Orders    Ambulatory Referral to General Surgery    Hiatal hernia    Relevant Medications    omeprazole (priLOSEC) 40 MG capsule    Other Relevant Orders    Ambulatory Referral to General Surgery    Colon cancer screening    Relevant Orders    Ambulatory Referral to General Surgery       Mental Health    Anxiety - Primary     Restart buspar since it is worse  Cut in half bc she felt groggy on 7.5mg    Return in 4 weeks or sooner if needed    Diagnosis, treatment and and course discussed. Potential side effects discussed. Return if there is worsening or persistence of symptoms.            Relevant Medications    busPIRone (BUSPAR) 7.5 MG tablet       Tobacco    History of tobacco use     Other Visit Diagnoses       Overweight with body mass index (BMI) of 26 to 26.9 in adult              Diagnoses         Codes Comments    Anxiety    -  Primary ICD-10-CM: F41.9  ICD-9-CM: 300.00     History of tobacco use     ICD-10-CM: Z87.891  ICD-9-CM: V15.82     Overweight with body mass index (BMI) of 26 to 26.9 in adult     ICD-10-CM: E66.3, Z68.26  ICD-9-CM: 278.02, V85.22     Colon cancer screening     ICD-10-CM: Z12.11  ICD-9-CM: V76.51     Hiatal hernia     ICD-10-CM: K44.9  ICD-9-CM: 553.3     Gastroesophageal reflux disease, unspecified whether esophagitis present     ICD-10-CM: K21.9  ICD-9-CM: 530.81     Mixed hyperlipidemia     ICD-10-CM: E78.2  ICD-9-CM: 272.2                     An After Visit Summary and PPPS were given to the patient.

## 2024-01-26 ENCOUNTER — OFFICE VISIT (OUTPATIENT)
Dept: FAMILY MEDICINE CLINIC | Facility: CLINIC | Age: 54
End: 2024-01-26
Payer: COMMERCIAL

## 2024-01-26 VITALS
RESPIRATION RATE: 18 BRPM | HEIGHT: 61 IN | HEART RATE: 90 BPM | BODY MASS INDEX: 26.88 KG/M2 | DIASTOLIC BLOOD PRESSURE: 80 MMHG | WEIGHT: 142.4 LBS | TEMPERATURE: 97.7 F | SYSTOLIC BLOOD PRESSURE: 126 MMHG | OXYGEN SATURATION: 99 %

## 2024-01-26 DIAGNOSIS — K21.9 GASTROESOPHAGEAL REFLUX DISEASE, UNSPECIFIED WHETHER ESOPHAGITIS PRESENT: ICD-10-CM

## 2024-01-26 DIAGNOSIS — Z12.11 COLON CANCER SCREENING: ICD-10-CM

## 2024-01-26 DIAGNOSIS — Z87.891 HISTORY OF TOBACCO USE: ICD-10-CM

## 2024-01-26 DIAGNOSIS — K44.9 HIATAL HERNIA: ICD-10-CM

## 2024-01-26 DIAGNOSIS — E78.2 MIXED HYPERLIPIDEMIA: ICD-10-CM

## 2024-01-26 DIAGNOSIS — E66.3 OVERWEIGHT WITH BODY MASS INDEX (BMI) OF 26 TO 26.9 IN ADULT: ICD-10-CM

## 2024-01-26 DIAGNOSIS — F41.9 ANXIETY: Primary | ICD-10-CM

## 2024-01-26 RX ORDER — BUSPIRONE HYDROCHLORIDE 7.5 MG/1
7.5 TABLET ORAL TAKE AS DIRECTED
Qty: 53 TABLET | Refills: 1 | Status: SHIPPED | OUTPATIENT
Start: 2024-01-26

## 2024-01-26 RX ORDER — OMEPRAZOLE 40 MG/1
40 CAPSULE, DELAYED RELEASE ORAL DAILY
Qty: 90 CAPSULE | Refills: 3 | Status: SHIPPED | OUTPATIENT
Start: 2024-01-26

## 2024-01-26 RX ORDER — ROSUVASTATIN CALCIUM 5 MG/1
5 TABLET, COATED ORAL NIGHTLY
Qty: 90 TABLET | Refills: 3 | Status: SHIPPED | OUTPATIENT
Start: 2024-01-26

## 2024-01-26 NOTE — ASSESSMENT & PLAN NOTE
Restart buspar since it is worse  Cut in half bc she felt groggy on 7.5mg    Return in 4 weeks or sooner if needed    Diagnosis, treatment and and course discussed. Potential side effects discussed. Return if there is worsening or persistence of symptoms.

## 2024-02-01 ENCOUNTER — OFFICE VISIT (OUTPATIENT)
Dept: SURGERY | Facility: CLINIC | Age: 54
End: 2024-02-01
Payer: COMMERCIAL

## 2024-02-01 VITALS
HEART RATE: 83 BPM | BODY MASS INDEX: 26.81 KG/M2 | WEIGHT: 142 LBS | HEIGHT: 61 IN | TEMPERATURE: 98.6 F | SYSTOLIC BLOOD PRESSURE: 136 MMHG | DIASTOLIC BLOOD PRESSURE: 91 MMHG | OXYGEN SATURATION: 98 %

## 2024-02-01 DIAGNOSIS — K21.9 HIATAL HERNIA WITH GERD: Primary | ICD-10-CM

## 2024-02-01 DIAGNOSIS — K44.9 HIATAL HERNIA WITH GERD: Primary | ICD-10-CM

## 2024-02-01 PROCEDURE — 99204 OFFICE O/P NEW MOD 45 MIN: CPT | Performed by: SURGERY

## 2024-02-01 RX ORDER — LANOLIN ALCOHOL/MO/W.PET/CERES
1000 CREAM (GRAM) TOPICAL DAILY
COMMUNITY

## 2024-02-02 NOTE — PROGRESS NOTES
CHIEF COMPLAINT:    Hiatal hernia, reflux    HISTORY OF PRESENT ILLNESS:    Aure Bernard is a 53 y.o. female who is referred today for a hiatal hernia.  The patient has longstanding history of hiatal hernia and reflux.  She had previously undergone endoscopies with Dr. Prieto in 2013 and 2018.  Her last colonoscopy did show a few small polyps, the pathology is not available to me.  She did have hiatal hernia noted on her most recent EGD in 2018.  The patient has reflux symptoms frequently.  She is here today as she is due for repeat colonoscopy and desires repair of her hiatal hernia.  She notes no dysphagia.    Past Medical History:   Diagnosis Date    Acute bilateral low back pain without sciatica     Anxiety     GERD with esophagitis     Headache     History of tobacco use 12/5/2019    Seizures     hx epilepsy, no meds currently, no seizure activity >30 yr       Past Surgical History:   Procedure Laterality Date    HYSTERECTOMY      bleeding, pain. no cancer       Prior to Admission medications    Medication Sig Start Date End Date Taking? Authorizing Provider   ASHWAGANDHA PO Take  by mouth.   Yes Rene Olivas MD   coenzyme Q10 100 MG capsule Take 1 capsule by mouth Daily.   Yes Rene Olivas MD   fexofenadine (ALLEGRA) 180 MG tablet Take  by mouth Daily.   Yes Rene Olivas MD   magnesium chloride ER 64 MG DR tablet Take  by mouth Daily.   Yes Rene Olivas MD   Omega-3 Fatty Acids (fish oil) 1000 MG capsule capsule Take  by mouth Daily With Breakfast.   Yes Rene Olivas MD   omeprazole (priLOSEC) 40 MG capsule Take 1 capsule by mouth Daily. 1/26/24  Yes Alia Roe MD   polyethylene glycol (MIRALAX) 17 GM/SCOOP powder Take  by mouth Daily.   Yes Rene Olivas MD   rosuvastatin (CRESTOR) 5 MG tablet Take 1 tablet by mouth Every Night.  Patient taking differently: Take 1 tablet by mouth Every Night. QOD 1/26/24  Yes Alia Roe MD   triamcinolone  "(KENALOG) 0.1 % ointment Apply 1 application  topically to the appropriate area as directed 2 (Two) Times a Day. 23  Yes Alia Roe MD   vitamin B-12 (CYANOCOBALAMIN) 1000 MCG tablet Take 1 tablet by mouth Daily.   Yes Provider, MD Rene   busPIRone (BUSPAR) 7.5 MG tablet Take 1 tablet by mouth Take As Directed. 1 daily for 7 days, then BID.  Patient not taking: Reported on 2024   Alia Roe MD       No Known Allergies    Family History   Problem Relation Age of Onset    Colon cancer Maternal Grandfather     Breast cancer Neg Hx     Ovarian cancer Neg Hx        Social History     Socioeconomic History    Marital status:    Tobacco Use    Smoking status: Former     Packs/day: 0.50     Years: 5.00     Additional pack years: 0.00     Total pack years: 2.50     Types: Cigarettes     Start date:      Quit date:      Years since quittin.1     Passive exposure: Past    Smokeless tobacco: Never   Vaping Use    Vaping Use: Never used   Substance and Sexual Activity    Alcohol use: Yes     Alcohol/week: 2.0 standard drinks of alcohol     Types: 2 Glasses of wine per week    Drug use: No    Sexual activity: Defer       Review of Systems   Gastrointestinal:  Negative for abdominal pain.       Objective     /91 (BP Location: Right arm, Patient Position: Sitting, Cuff Size: Adult)   Pulse 83   Temp 98.6 °F (37 °C) (Infrared)   Ht 154.9 cm (61\")   Wt 64.4 kg (142 lb)   SpO2 98%   BMI 26.83 kg/m²     Physical Exam  Constitutional:       General: She is not in acute distress.     Appearance: Normal appearance. She is not ill-appearing, toxic-appearing or diaphoretic.   HENT:      Head: Normocephalic and atraumatic.   Eyes:      General:         Right eye: No discharge.         Left eye: No discharge.      Extraocular Movements: Extraocular movements intact.      Conjunctiva/sclera: Conjunctivae normal.   Pulmonary:      Effort: Pulmonary effort is normal. No " respiratory distress.   Abdominal:      General: There is no distension.      Palpations: Abdomen is soft. There is no mass.      Tenderness: There is no abdominal tenderness. There is no guarding or rebound.      Hernia: No hernia is present.   Skin:     General: Skin is warm.      Coloration: Skin is not jaundiced.   Neurological:      General: No focal deficit present.      Mental Status: She is alert and oriented to person, place, and time.   Psychiatric:         Mood and Affect: Mood normal.         Behavior: Behavior normal.         Thought Content: Thought content normal.         Judgment: Judgment normal.         DIAGNOSTIC DATA:    Prior EGD and colonoscopy report from 2018 were reviewed showing hiatal hernia on EGD and a few small polyps on colonoscopy.    ASSESSMENT:    Hiatal hernia, history of colon polyps    PLAN:    I discussed the hiatal hernia with the patient and the mechanism by which hiatal hernias can cause or contribute to reflux.  She would like to undergo repair of her hiatal hernia which I believe is reasonable.  She is due for repeat colonoscopy and I would like her to undergo EGD prior to hiatal hernia surgery as well.  Given that she has had all of her prior scopes done by Dr. Prieto I have referred her to their office for the endoscopies.  I will also order an upper GI for further evaluation of the hiatal hernia as well.  She will see us back after the studies have been completed to discuss repair.          This document has been electronically signed by John Bynum MD on February 2, 2024 09:29 EST

## 2024-02-03 NOTE — ASSESSMENT & PLAN NOTE
Lipid abnormalities are worse  Nutritional counseling was provided. Start crestor  Lipids will be reassessed in 3 months.    Diagnosis, treatment and and course discussed. Potential side effects discussed. Return if there is worsening or persistence of symptoms.

## 2024-03-29 ENCOUNTER — HOSPITAL ENCOUNTER (OUTPATIENT)
Dept: GENERAL RADIOLOGY | Facility: HOSPITAL | Age: 54
Discharge: HOME | End: 2024-03-29
Admitting: SURGERY
Payer: COMMERCIAL

## 2024-03-29 PROCEDURE — 74246 X-RAY XM UPR GI TRC 2CNTRST: CPT

## 2024-03-29 RX ADMIN — BARIUM SULFATE 183 ML: 960 POWDER, FOR SUSPENSION ORAL at 08:45

## 2024-03-29 RX ADMIN — BARIUM SULFATE 135 ML: 980 POWDER, FOR SUSPENSION ORAL at 08:45

## 2024-03-29 RX ADMIN — BARIUM SULFATE 700 MG: 700 TABLET ORAL at 08:45

## 2024-04-26 ENCOUNTER — OFFICE VISIT (OUTPATIENT)
Dept: FAMILY MEDICINE CLINIC | Facility: CLINIC | Age: 54
End: 2024-04-26
Payer: COMMERCIAL

## 2024-04-26 VITALS
TEMPERATURE: 97.5 F | HEIGHT: 61 IN | RESPIRATION RATE: 18 BRPM | OXYGEN SATURATION: 97 % | DIASTOLIC BLOOD PRESSURE: 74 MMHG | SYSTOLIC BLOOD PRESSURE: 116 MMHG | BODY MASS INDEX: 26.77 KG/M2 | HEART RATE: 93 BPM | WEIGHT: 141.8 LBS

## 2024-04-26 DIAGNOSIS — E78.2 MIXED HYPERLIPIDEMIA: Primary | ICD-10-CM

## 2024-04-26 DIAGNOSIS — Z87.891 HISTORY OF TOBACCO USE: ICD-10-CM

## 2024-04-26 DIAGNOSIS — K21.9 GASTROESOPHAGEAL REFLUX DISEASE, UNSPECIFIED WHETHER ESOPHAGITIS PRESENT: ICD-10-CM

## 2024-04-26 DIAGNOSIS — K44.9 HIATAL HERNIA: ICD-10-CM

## 2024-04-26 DIAGNOSIS — E66.3 OVERWEIGHT WITH BODY MASS INDEX (BMI) OF 26 TO 26.9 IN ADULT: ICD-10-CM

## 2024-04-26 PROCEDURE — 99214 OFFICE O/P EST MOD 30 MIN: CPT | Performed by: FAMILY MEDICINE

## 2024-04-26 RX ORDER — PANTOPRAZOLE SODIUM 40 MG/1
40 TABLET, DELAYED RELEASE ORAL DAILY
Qty: 30 TABLET | Refills: 4 | Status: SHIPPED | OUTPATIENT
Start: 2024-04-26

## 2024-04-28 LAB
ALBUMIN SERPL-MCNC: 4.5 G/DL (ref 3.8–4.9)
ALBUMIN/GLOB SERPL: 2 {RATIO} (ref 1.2–2.2)
ALP SERPL-CCNC: 77 IU/L (ref 44–121)
ALT SERPL-CCNC: 39 IU/L (ref 0–32)
AST SERPL-CCNC: 24 IU/L (ref 0–40)
BILIRUB SERPL-MCNC: 0.4 MG/DL (ref 0–1.2)
BUN SERPL-MCNC: 13 MG/DL (ref 6–24)
BUN/CREAT SERPL: 15 (ref 9–23)
CALCIUM SERPL-MCNC: 9.6 MG/DL (ref 8.7–10.2)
CHLORIDE SERPL-SCNC: 105 MMOL/L (ref 96–106)
CHOLEST SERPL-MCNC: 157 MG/DL (ref 100–199)
CHOLEST/HDLC SERPL: 2.5 RATIO (ref 0–4.4)
CO2 SERPL-SCNC: 24 MMOL/L (ref 20–29)
CREAT SERPL-MCNC: 0.87 MG/DL (ref 0.57–1)
EGFRCR SERPLBLD CKD-EPI 2021: 81 ML/MIN/1.73
GLOBULIN SER CALC-MCNC: 2.2 G/DL (ref 1.5–4.5)
GLUCOSE SERPL-MCNC: 102 MG/DL (ref 70–99)
HDLC SERPL-MCNC: 63 MG/DL
LDLC SERPL CALC-MCNC: 79 MG/DL (ref 0–99)
POTASSIUM SERPL-SCNC: 4.4 MMOL/L (ref 3.5–5.2)
PROT SERPL-MCNC: 6.7 G/DL (ref 6–8.5)
SODIUM SERPL-SCNC: 142 MMOL/L (ref 134–144)
TRIGL SERPL-MCNC: 77 MG/DL (ref 0–149)
VLDLC SERPL CALC-MCNC: 15 MG/DL (ref 5–40)

## 2024-05-08 ENCOUNTER — ON CAMPUS - OUTPATIENT (AMBULATORY)
Dept: URBAN - METROPOLITAN AREA HOSPITAL 2 | Facility: HOSPITAL | Age: 54
End: 2024-05-08
Payer: COMMERCIAL

## 2024-05-08 ENCOUNTER — OFFICE (AMBULATORY)
Dept: URBAN - METROPOLITAN AREA PATHOLOGY 19 | Facility: PATHOLOGY | Age: 54
End: 2024-05-08
Payer: COMMERCIAL

## 2024-05-08 VITALS
HEART RATE: 101 BPM | SYSTOLIC BLOOD PRESSURE: 129 MMHG | RESPIRATION RATE: 16 BRPM | DIASTOLIC BLOOD PRESSURE: 65 MMHG | RESPIRATION RATE: 15 BRPM | RESPIRATION RATE: 18 BRPM | TEMPERATURE: 98 F | DIASTOLIC BLOOD PRESSURE: 62 MMHG | SYSTOLIC BLOOD PRESSURE: 125 MMHG | DIASTOLIC BLOOD PRESSURE: 87 MMHG | WEIGHT: 138 LBS | HEART RATE: 112 BPM | OXYGEN SATURATION: 97 % | SYSTOLIC BLOOD PRESSURE: 147 MMHG | DIASTOLIC BLOOD PRESSURE: 75 MMHG | HEART RATE: 66 BPM | DIASTOLIC BLOOD PRESSURE: 74 MMHG | HEIGHT: 61 IN | RESPIRATION RATE: 17 BRPM | DIASTOLIC BLOOD PRESSURE: 66 MMHG | SYSTOLIC BLOOD PRESSURE: 118 MMHG | OXYGEN SATURATION: 99 % | DIASTOLIC BLOOD PRESSURE: 101 MMHG | SYSTOLIC BLOOD PRESSURE: 135 MMHG | DIASTOLIC BLOOD PRESSURE: 69 MMHG | HEART RATE: 91 BPM | OXYGEN SATURATION: 98 % | HEART RATE: 76 BPM | HEART RATE: 99 BPM | SYSTOLIC BLOOD PRESSURE: 106 MMHG | DIASTOLIC BLOOD PRESSURE: 56 MMHG | SYSTOLIC BLOOD PRESSURE: 107 MMHG | HEART RATE: 97 BPM | SYSTOLIC BLOOD PRESSURE: 102 MMHG | RESPIRATION RATE: 14 BRPM | OXYGEN SATURATION: 100 % | HEART RATE: 103 BPM

## 2024-05-08 DIAGNOSIS — R13.10 DYSPHAGIA, UNSPECIFIED: ICD-10-CM

## 2024-05-08 DIAGNOSIS — K31.89 OTHER DISEASES OF STOMACH AND DUODENUM: ICD-10-CM

## 2024-05-08 DIAGNOSIS — D12.2 BENIGN NEOPLASM OF ASCENDING COLON: ICD-10-CM

## 2024-05-08 DIAGNOSIS — K63.5 POLYP OF COLON: ICD-10-CM

## 2024-05-08 DIAGNOSIS — R10.12 LEFT UPPER QUADRANT PAIN: ICD-10-CM

## 2024-05-08 DIAGNOSIS — K57.30 DIVERTICULOSIS OF LARGE INTESTINE WITHOUT PERFORATION OR ABS: ICD-10-CM

## 2024-05-08 DIAGNOSIS — Z86.010 PERSONAL HISTORY OF COLONIC POLYPS: ICD-10-CM

## 2024-05-08 DIAGNOSIS — K44.9 DIAPHRAGMATIC HERNIA WITHOUT OBSTRUCTION OR GANGRENE: ICD-10-CM

## 2024-05-08 DIAGNOSIS — Z09 ENCOUNTER FOR FOLLOW-UP EXAMINATION AFTER COMPLETED TREATMEN: ICD-10-CM

## 2024-05-08 DIAGNOSIS — R11.10 VOMITING, UNSPECIFIED: ICD-10-CM

## 2024-05-08 LAB
GI HISTOLOGY: A. STOMACH ANTRUM: (no result)
GI HISTOLOGY: B. ASCENDING COLON: (no result)
GI HISTOLOGY: C. SIGMOID COLON: (no result)
GI HISTOLOGY: PDF REPORT: (no result)

## 2024-05-08 PROCEDURE — 88305 TISSUE EXAM BY PATHOLOGIST: CPT | Performed by: PATHOLOGY

## 2024-05-08 PROCEDURE — 45385 COLONOSCOPY W/LESION REMOVAL: CPT | Mod: 33 | Performed by: INTERNAL MEDICINE

## 2024-05-08 PROCEDURE — 43249 ESOPH EGD DILATION <30 MM: CPT | Performed by: INTERNAL MEDICINE

## 2024-05-14 ENCOUNTER — TELEPHONE (OUTPATIENT)
Dept: SURGERY | Facility: CLINIC | Age: 54
End: 2024-05-14

## 2024-05-14 NOTE — TELEPHONE ENCOUNTER
Provider: DR. JOSE MIGUEL ROBERSON    [facility] received a fax that requires your attention. The document has been indexed to the patient's chart for your review.     Reason for sending: REVIEW     Documents Description: EDG      Name of Sender: ZITA GOLDMAN MD     Date Indexed: 05/14/24    Notes (if needed): INDEXED INTO CHART EGD 5/8/24, SUMMARY NOTES OF EGD 5/8/24.

## 2024-05-20 ENCOUNTER — OFFICE VISIT (OUTPATIENT)
Dept: SURGERY | Facility: CLINIC | Age: 54
End: 2024-05-20
Payer: COMMERCIAL

## 2024-05-20 VITALS
WEIGHT: 139 LBS | OXYGEN SATURATION: 98 % | HEIGHT: 61 IN | HEART RATE: 68 BPM | TEMPERATURE: 98.4 F | DIASTOLIC BLOOD PRESSURE: 87 MMHG | SYSTOLIC BLOOD PRESSURE: 138 MMHG | BODY MASS INDEX: 26.24 KG/M2

## 2024-05-20 DIAGNOSIS — K44.9 HIATAL HERNIA WITH GASTROESOPHAGEAL REFLUX: Primary | ICD-10-CM

## 2024-05-20 DIAGNOSIS — K21.9 HIATAL HERNIA WITH GASTROESOPHAGEAL REFLUX: Primary | ICD-10-CM

## 2024-05-20 PROCEDURE — 99214 OFFICE O/P EST MOD 30 MIN: CPT | Performed by: SURGERY

## 2024-05-20 NOTE — PROGRESS NOTES
CHIEF COMPLAINT:    Chief Complaint   Patient presents with    Post-op Follow-up     Follow up hiatal hernia        HISTORY OF PRESENT ILLNESS:    Aure Bernard is a 53 y.o. female who was seen previously for reflux and hiatal hernia.  Since her last visit she has undergone upper GI that showed a small to moderate hiatal hernia with reflux.  She is also undergone EGD and colonoscopy.  She continues to have reflux symptoms on a daily basis.  She is currently on Protonix.    EXAM:  Vitals:    05/20/24 0758   BP: 138/87   Pulse: 68   Temp: 98.4 °F (36.9 °C)   SpO2: 98%         Abdomen soft    ASSESSMENT:    Hiatal hernia with reflux    PLAN:    I had a long discussion with her today regarding the anatomy of the area and the change in the anatomy that results when a hiatal hernia is present.  We talked about how this contributes to reflux.  We discussed hiatal hernia repair and fundoplication.  The risks and benefits of robotic assisted laparoscopic hiatal hernia repair with fundoplication and esophagogastroduodenoscopy were reviewed with her.  Postoperative dietary changes and restrictions were also discussed.  She will be scheduled.          This document has been electronically signed by John Bynum MD on May 20, 2024 08:27 EDT

## 2024-06-05 ENCOUNTER — LAB (OUTPATIENT)
Dept: LAB | Facility: HOSPITAL | Age: 54
End: 2024-06-05
Payer: COMMERCIAL

## 2024-06-05 LAB
ABO GROUP BLD: NORMAL
BLD GP AB SCN SERPL QL: NEGATIVE
DEPRECATED RDW RBC AUTO: 39.8 FL (ref 37–54)
ERYTHROCYTE [DISTWIDTH] IN BLOOD BY AUTOMATED COUNT: 12.1 % (ref 12.3–15.4)
HCT VFR BLD AUTO: 38.8 % (ref 34–46.6)
HGB BLD-MCNC: 13.1 G/DL (ref 12–15.9)
MCH RBC QN AUTO: 30.7 PG (ref 26.6–33)
MCHC RBC AUTO-ENTMCNC: 33.8 G/DL (ref 31.5–35.7)
MCV RBC AUTO: 90.9 FL (ref 79–97)
PLATELET # BLD AUTO: 267 10*3/MM3 (ref 140–450)
PMV BLD AUTO: 10.2 FL (ref 6–12)
RBC # BLD AUTO: 4.27 10*6/MM3 (ref 3.77–5.28)
RH BLD: POSITIVE
T&S EXPIRATION DATE: NORMAL
WBC NRBC COR # BLD AUTO: 5.08 10*3/MM3 (ref 3.4–10.8)

## 2024-06-05 PROCEDURE — 86900 BLOOD TYPING SEROLOGIC ABO: CPT | Performed by: SURGERY

## 2024-06-05 PROCEDURE — 36415 COLL VENOUS BLD VENIPUNCTURE: CPT | Performed by: SURGERY

## 2024-06-05 PROCEDURE — 86901 BLOOD TYPING SEROLOGIC RH(D): CPT

## 2024-06-05 PROCEDURE — 85027 COMPLETE CBC AUTOMATED: CPT | Performed by: SURGERY

## 2024-06-05 PROCEDURE — 86901 BLOOD TYPING SEROLOGIC RH(D): CPT | Performed by: SURGERY

## 2024-06-05 PROCEDURE — 86850 RBC ANTIBODY SCREEN: CPT | Performed by: SURGERY

## 2024-06-05 PROCEDURE — 86900 BLOOD TYPING SEROLOGIC ABO: CPT

## 2024-06-11 ENCOUNTER — HOSPITAL ENCOUNTER (OUTPATIENT)
Facility: HOSPITAL | Age: 54
Discharge: HOME | End: 2024-06-12
Attending: SURGERY | Admitting: SURGERY
Payer: COMMERCIAL

## 2024-06-11 ENCOUNTER — ANESTHESIA EVENT (OUTPATIENT)
Dept: PERIOP | Facility: HOSPITAL | Age: 54
End: 2024-06-11
Payer: COMMERCIAL

## 2024-06-11 ENCOUNTER — ANESTHESIA (OUTPATIENT)
Dept: PERIOP | Facility: HOSPITAL | Age: 54
End: 2024-06-11
Payer: COMMERCIAL

## 2024-06-11 DIAGNOSIS — K44.9 HIATAL HERNIA WITH GASTROESOPHAGEAL REFLUX: ICD-10-CM

## 2024-06-11 DIAGNOSIS — K21.9 HIATAL HERNIA WITH GASTROESOPHAGEAL REFLUX: ICD-10-CM

## 2024-06-11 LAB
ANION GAP SERPL CALCULATED.3IONS-SCNC: 11.7 MMOL/L (ref 5–15)
BASOPHILS # BLD AUTO: 0.01 10*3/MM3 (ref 0–0.2)
BASOPHILS NFR BLD AUTO: 0.1 % (ref 0–1.5)
BUN SERPL-MCNC: 13 MG/DL (ref 6–20)
BUN/CREAT SERPL: 14.8 (ref 7–25)
CALCIUM SPEC-SCNC: 8.7 MG/DL (ref 8.6–10.5)
CHLORIDE SERPL-SCNC: 105 MMOL/L (ref 98–107)
CO2 SERPL-SCNC: 22.3 MMOL/L (ref 22–29)
CREAT SERPL-MCNC: 0.88 MG/DL (ref 0.57–1)
DEPRECATED RDW RBC AUTO: 39.8 FL (ref 37–54)
EGFRCR SERPLBLD CKD-EPI 2021: 79.2 ML/MIN/1.73
EOSINOPHIL # BLD AUTO: 0 10*3/MM3 (ref 0–0.4)
EOSINOPHIL NFR BLD AUTO: 0 % (ref 0.3–6.2)
ERYTHROCYTE [DISTWIDTH] IN BLOOD BY AUTOMATED COUNT: 11.9 % (ref 12.3–15.4)
GLUCOSE BLDC GLUCOMTR-MCNC: 180 MG/DL (ref 70–105)
GLUCOSE SERPL-MCNC: 144 MG/DL (ref 65–99)
HCT VFR BLD AUTO: 40.1 % (ref 34–46.6)
HGB BLD-MCNC: 13.1 G/DL (ref 12–15.9)
IMM GRANULOCYTES # BLD AUTO: 0.02 10*3/MM3 (ref 0–0.05)
IMM GRANULOCYTES NFR BLD AUTO: 0.2 % (ref 0–0.5)
LYMPHOCYTES # BLD AUTO: 0.53 10*3/MM3 (ref 0.7–3.1)
LYMPHOCYTES NFR BLD AUTO: 6 % (ref 19.6–45.3)
MCH RBC QN AUTO: 29.9 PG (ref 26.6–33)
MCHC RBC AUTO-ENTMCNC: 32.7 G/DL (ref 31.5–35.7)
MCV RBC AUTO: 91.6 FL (ref 79–97)
MONOCYTES # BLD AUTO: 0.09 10*3/MM3 (ref 0.1–0.9)
MONOCYTES NFR BLD AUTO: 1 % (ref 5–12)
NEUTROPHILS NFR BLD AUTO: 8.24 10*3/MM3 (ref 1.7–7)
NEUTROPHILS NFR BLD AUTO: 92.7 % (ref 42.7–76)
NRBC BLD AUTO-RTO: 0 /100 WBC (ref 0–0.2)
PLATELET # BLD AUTO: 267 10*3/MM3 (ref 140–450)
PMV BLD AUTO: 9.8 FL (ref 6–12)
POTASSIUM SERPL-SCNC: 3.9 MMOL/L (ref 3.5–5.2)
RBC # BLD AUTO: 4.38 10*6/MM3 (ref 3.77–5.28)
SODIUM SERPL-SCNC: 139 MMOL/L (ref 136–145)
WBC NRBC COR # BLD AUTO: 8.89 10*3/MM3 (ref 3.4–10.8)

## 2024-06-11 PROCEDURE — S0260 H&P FOR SURGERY: HCPCS | Performed by: SURGERY

## 2024-06-11 PROCEDURE — G0378 HOSPITAL OBSERVATION PER HR: HCPCS

## 2024-06-11 PROCEDURE — 25010000002 ONDANSETRON PER 1 MG: Performed by: SURGERY

## 2024-06-11 PROCEDURE — 43280 LAPAROSCOPY FUNDOPLASTY: CPT | Performed by: BEHAVIOR TECHNICIAN

## 2024-06-11 PROCEDURE — 99024 POSTOP FOLLOW-UP VISIT: CPT | Performed by: SURGERY

## 2024-06-11 PROCEDURE — 25010000002 CEFAZOLIN PER 500 MG

## 2024-06-11 PROCEDURE — 25010000002 CEFAZOLIN PER 500 MG: Performed by: SURGERY

## 2024-06-11 PROCEDURE — 25010000002 ONDANSETRON PER 1 MG

## 2024-06-11 PROCEDURE — 25010000002 MIDAZOLAM PER 1 MG

## 2024-06-11 PROCEDURE — 25810000003 SODIUM CHLORIDE 0.9 % SOLUTION: Performed by: SURGERY

## 2024-06-11 PROCEDURE — 25010000002 HYDROMORPHONE 1 MG/ML SOLUTION

## 2024-06-11 PROCEDURE — 25810000003 LACTATED RINGERS PER 1000 ML

## 2024-06-11 PROCEDURE — 25010000002 ESMOLOL 100 MG/10ML SOLUTION

## 2024-06-11 PROCEDURE — 43280 LAPAROSCOPY FUNDOPLASTY: CPT | Performed by: SURGERY

## 2024-06-11 PROCEDURE — 82948 REAGENT STRIP/BLOOD GLUCOSE: CPT

## 2024-06-11 PROCEDURE — 25010000002 BUPIVACAINE 0.25 % SOLUTION: Performed by: SURGERY

## 2024-06-11 PROCEDURE — 25010000002 DEXAMETHASONE PER 1 MG

## 2024-06-11 PROCEDURE — 25010000002 PROPOFOL 1000 MG/100ML EMULSION

## 2024-06-11 PROCEDURE — 80048 BASIC METABOLIC PNL TOTAL CA: CPT | Performed by: SURGERY

## 2024-06-11 PROCEDURE — 85025 COMPLETE CBC W/AUTO DIFF WBC: CPT | Performed by: SURGERY

## 2024-06-11 PROCEDURE — 25010000002 PHENYLEPHRINE 10 MG/ML SOLUTION

## 2024-06-11 PROCEDURE — 25010000002 FENTANYL CITRATE (PF) 100 MCG/2ML SOLUTION

## 2024-06-11 PROCEDURE — 25010000002 SUGAMMADEX 200 MG/2ML SOLUTION

## 2024-06-11 DEVICE — ABSORBABLE WOUND CLOSURE DEVICE
Type: IMPLANTABLE DEVICE | Site: STOMACH | Status: FUNCTIONAL
Brand: V-LOC 180

## 2024-06-11 RX ORDER — FLUMAZENIL 0.1 MG/ML
0.2 INJECTION INTRAVENOUS AS NEEDED
Status: DISCONTINUED | OUTPATIENT
Start: 2024-06-11 | End: 2024-06-11 | Stop reason: HOSPADM

## 2024-06-11 RX ORDER — ACETAMINOPHEN 650 MG/1
650 SUPPOSITORY RECTAL EVERY 4 HOURS PRN
Status: DISCONTINUED | OUTPATIENT
Start: 2024-06-11 | End: 2024-06-12 | Stop reason: HOSPADM

## 2024-06-11 RX ORDER — ESMOLOL HYDROCHLORIDE 10 MG/ML
INJECTION INTRAVENOUS AS NEEDED
Status: DISCONTINUED | OUTPATIENT
Start: 2024-06-11 | End: 2024-06-11 | Stop reason: SURG

## 2024-06-11 RX ORDER — FENTANYL CITRATE 50 UG/ML
50 INJECTION, SOLUTION INTRAMUSCULAR; INTRAVENOUS
Status: DISCONTINUED | OUTPATIENT
Start: 2024-06-11 | End: 2024-06-11 | Stop reason: HOSPADM

## 2024-06-11 RX ORDER — MIDAZOLAM HYDROCHLORIDE 1 MG/ML
INJECTION INTRAMUSCULAR; INTRAVENOUS AS NEEDED
Status: DISCONTINUED | OUTPATIENT
Start: 2024-06-11 | End: 2024-06-11 | Stop reason: SURG

## 2024-06-11 RX ORDER — DIPHENHYDRAMINE HYDROCHLORIDE 50 MG/ML
12.5 INJECTION INTRAMUSCULAR; INTRAVENOUS
Status: DISCONTINUED | OUTPATIENT
Start: 2024-06-11 | End: 2024-06-11 | Stop reason: HOSPADM

## 2024-06-11 RX ORDER — SODIUM CHLORIDE, SODIUM LACTATE, POTASSIUM CHLORIDE, CALCIUM CHLORIDE 600; 310; 30; 20 MG/100ML; MG/100ML; MG/100ML; MG/100ML
INJECTION, SOLUTION INTRAVENOUS CONTINUOUS PRN
Status: DISCONTINUED | OUTPATIENT
Start: 2024-06-11 | End: 2024-06-11 | Stop reason: SURG

## 2024-06-11 RX ORDER — PROMETHAZINE HYDROCHLORIDE 25 MG/1
25 SUPPOSITORY RECTAL ONCE AS NEEDED
Status: DISCONTINUED | OUTPATIENT
Start: 2024-06-11 | End: 2024-06-11 | Stop reason: HOSPADM

## 2024-06-11 RX ORDER — LIDOCAINE HYDROCHLORIDE 10 MG/ML
INJECTION, SOLUTION EPIDURAL; INFILTRATION; INTRACAUDAL; PERINEURAL AS NEEDED
Status: DISCONTINUED | OUTPATIENT
Start: 2024-06-11 | End: 2024-06-11 | Stop reason: SURG

## 2024-06-11 RX ORDER — DEXMEDETOMIDINE HYDROCHLORIDE 100 UG/ML
INJECTION, SOLUTION INTRAVENOUS AS NEEDED
Status: DISCONTINUED | OUTPATIENT
Start: 2024-06-11 | End: 2024-06-11 | Stop reason: SURG

## 2024-06-11 RX ORDER — IPRATROPIUM BROMIDE AND ALBUTEROL SULFATE 2.5; .5 MG/3ML; MG/3ML
3 SOLUTION RESPIRATORY (INHALATION) ONCE AS NEEDED
Status: DISCONTINUED | OUTPATIENT
Start: 2024-06-11 | End: 2024-06-11 | Stop reason: HOSPADM

## 2024-06-11 RX ORDER — HYDROCODONE BITARTRATE AND ACETAMINOPHEN 7.5; 325 MG/1; MG/1
1 TABLET ORAL EVERY 4 HOURS PRN
Status: DISCONTINUED | OUTPATIENT
Start: 2024-06-11 | End: 2024-06-12 | Stop reason: HOSPADM

## 2024-06-11 RX ORDER — PHENYLEPHRINE HYDROCHLORIDE 10 MG/ML
INJECTION INTRAVENOUS AS NEEDED
Status: DISCONTINUED | OUTPATIENT
Start: 2024-06-11 | End: 2024-06-11 | Stop reason: SURG

## 2024-06-11 RX ORDER — HYDRALAZINE HYDROCHLORIDE 20 MG/ML
5 INJECTION INTRAMUSCULAR; INTRAVENOUS
Status: DISCONTINUED | OUTPATIENT
Start: 2024-06-11 | End: 2024-06-11 | Stop reason: HOSPADM

## 2024-06-11 RX ORDER — PANTOPRAZOLE SODIUM 40 MG/1
40 TABLET, DELAYED RELEASE ORAL
Status: DISCONTINUED | OUTPATIENT
Start: 2024-06-12 | End: 2024-06-12 | Stop reason: HOSPADM

## 2024-06-11 RX ORDER — ONDANSETRON 4 MG/1
4 TABLET, ORALLY DISINTEGRATING ORAL EVERY 6 HOURS PRN
Status: DISCONTINUED | OUTPATIENT
Start: 2024-06-11 | End: 2024-06-12 | Stop reason: HOSPADM

## 2024-06-11 RX ORDER — PROPOFOL 10 MG/ML
INJECTION, EMULSION INTRAVENOUS AS NEEDED
Status: DISCONTINUED | OUTPATIENT
Start: 2024-06-11 | End: 2024-06-11 | Stop reason: SURG

## 2024-06-11 RX ORDER — SCOLOPAMINE TRANSDERMAL SYSTEM 1 MG/1
PATCH, EXTENDED RELEASE TRANSDERMAL AS NEEDED
Status: DISCONTINUED | OUTPATIENT
Start: 2024-06-11 | End: 2024-06-11 | Stop reason: SURG

## 2024-06-11 RX ORDER — ROCURONIUM BROMIDE 10 MG/ML
INJECTION, SOLUTION INTRAVENOUS AS NEEDED
Status: DISCONTINUED | OUTPATIENT
Start: 2024-06-11 | End: 2024-06-11 | Stop reason: SURG

## 2024-06-11 RX ORDER — BUPIVACAINE HYDROCHLORIDE 2.5 MG/ML
INJECTION, SOLUTION INFILTRATION; PERINEURAL AS NEEDED
Status: DISCONTINUED | OUTPATIENT
Start: 2024-06-11 | End: 2024-06-11 | Stop reason: HOSPADM

## 2024-06-11 RX ORDER — EPHEDRINE SULFATE 5 MG/ML
5 INJECTION INTRAVENOUS ONCE AS NEEDED
Status: DISCONTINUED | OUTPATIENT
Start: 2024-06-11 | End: 2024-06-11 | Stop reason: HOSPADM

## 2024-06-11 RX ORDER — ONDANSETRON 2 MG/ML
4 INJECTION INTRAMUSCULAR; INTRAVENOUS ONCE AS NEEDED
Status: DISCONTINUED | OUTPATIENT
Start: 2024-06-11 | End: 2024-06-11 | Stop reason: HOSPADM

## 2024-06-11 RX ORDER — ONDANSETRON 2 MG/ML
4 INJECTION INTRAMUSCULAR; INTRAVENOUS EVERY 6 HOURS PRN
Status: DISCONTINUED | OUTPATIENT
Start: 2024-06-11 | End: 2024-06-12 | Stop reason: HOSPADM

## 2024-06-11 RX ORDER — DEXAMETHASONE SODIUM PHOSPHATE 4 MG/ML
INJECTION, SOLUTION INTRA-ARTICULAR; INTRALESIONAL; INTRAMUSCULAR; INTRAVENOUS; SOFT TISSUE AS NEEDED
Status: DISCONTINUED | OUTPATIENT
Start: 2024-06-11 | End: 2024-06-11 | Stop reason: SURG

## 2024-06-11 RX ORDER — SODIUM CHLORIDE 9 MG/ML
125 INJECTION, SOLUTION INTRAVENOUS CONTINUOUS
Status: DISCONTINUED | OUTPATIENT
Start: 2024-06-11 | End: 2024-06-12 | Stop reason: HOSPADM

## 2024-06-11 RX ORDER — PROMETHAZINE HYDROCHLORIDE 25 MG/1
25 TABLET ORAL ONCE AS NEEDED
Status: DISCONTINUED | OUTPATIENT
Start: 2024-06-11 | End: 2024-06-11 | Stop reason: HOSPADM

## 2024-06-11 RX ORDER — ONDANSETRON 2 MG/ML
INJECTION INTRAMUSCULAR; INTRAVENOUS AS NEEDED
Status: DISCONTINUED | OUTPATIENT
Start: 2024-06-11 | End: 2024-06-11 | Stop reason: SURG

## 2024-06-11 RX ORDER — NALOXONE HCL 0.4 MG/ML
0.4 VIAL (ML) INJECTION AS NEEDED
Status: DISCONTINUED | OUTPATIENT
Start: 2024-06-11 | End: 2024-06-11 | Stop reason: HOSPADM

## 2024-06-11 RX ORDER — OXYCODONE HYDROCHLORIDE 5 MG/1
5 TABLET ORAL ONCE AS NEEDED
Status: DISCONTINUED | OUTPATIENT
Start: 2024-06-11 | End: 2024-06-11 | Stop reason: HOSPADM

## 2024-06-11 RX ORDER — NALOXONE HCL 0.4 MG/ML
0.1 VIAL (ML) INJECTION
Status: DISCONTINUED | OUTPATIENT
Start: 2024-06-11 | End: 2024-06-12 | Stop reason: HOSPADM

## 2024-06-11 RX ORDER — DIPHENHYDRAMINE HYDROCHLORIDE 50 MG/ML
12.5 INJECTION INTRAMUSCULAR; INTRAVENOUS ONCE AS NEEDED
Status: DISCONTINUED | OUTPATIENT
Start: 2024-06-11 | End: 2024-06-11 | Stop reason: HOSPADM

## 2024-06-11 RX ORDER — LABETALOL HYDROCHLORIDE 5 MG/ML
5 INJECTION, SOLUTION INTRAVENOUS
Status: DISCONTINUED | OUTPATIENT
Start: 2024-06-11 | End: 2024-06-11 | Stop reason: HOSPADM

## 2024-06-11 RX ORDER — ACETAMINOPHEN 325 MG/1
650 TABLET ORAL EVERY 4 HOURS PRN
Status: DISCONTINUED | OUTPATIENT
Start: 2024-06-11 | End: 2024-06-12 | Stop reason: HOSPADM

## 2024-06-11 RX ORDER — FENTANYL CITRATE 50 UG/ML
INJECTION, SOLUTION INTRAMUSCULAR; INTRAVENOUS AS NEEDED
Status: DISCONTINUED | OUTPATIENT
Start: 2024-06-11 | End: 2024-06-11 | Stop reason: SURG

## 2024-06-11 RX ORDER — OXYCODONE HYDROCHLORIDE 5 MG/1
10 TABLET ORAL EVERY 4 HOURS PRN
Status: DISCONTINUED | OUTPATIENT
Start: 2024-06-11 | End: 2024-06-11 | Stop reason: HOSPADM

## 2024-06-11 RX ADMIN — PHENYLEPHRINE HYDROCHLORIDE 200 MCG: 10 INJECTION INTRAVENOUS at 13:22

## 2024-06-11 RX ADMIN — ONDANSETRON 4 MG: 2 INJECTION INTRAMUSCULAR; INTRAVENOUS at 22:08

## 2024-06-11 RX ADMIN — ONDANSETRON 4 MG: 2 INJECTION INTRAMUSCULAR; INTRAVENOUS at 13:40

## 2024-06-11 RX ADMIN — ESMOLOL HYDROCHLORIDE 20 MG: 100 INJECTION, SOLUTION INTRAVENOUS at 12:28

## 2024-06-11 RX ADMIN — ESMOLOL HYDROCHLORIDE 20 MG: 100 INJECTION, SOLUTION INTRAVENOUS at 12:23

## 2024-06-11 RX ADMIN — FENTANYL CITRATE 100 MCG: 50 INJECTION, SOLUTION INTRAMUSCULAR; INTRAVENOUS at 13:52

## 2024-06-11 RX ADMIN — MIDAZOLAM 2 MG: 1 INJECTION INTRAMUSCULAR; INTRAVENOUS at 11:29

## 2024-06-11 RX ADMIN — PHENYLEPHRINE HYDROCHLORIDE 100 MCG: 10 INJECTION INTRAVENOUS at 12:51

## 2024-06-11 RX ADMIN — LIDOCAINE HYDROCHLORIDE 60 MG: 10 INJECTION, SOLUTION EPIDURAL; INFILTRATION; INTRACAUDAL; PERINEURAL at 11:35

## 2024-06-11 RX ADMIN — PROPOFOL INJECTABLE EMULSION 200 MG: 10 INJECTION, EMULSION INTRAVENOUS at 11:36

## 2024-06-11 RX ADMIN — ROCURONIUM BROMIDE 20 MG: 10 INJECTION, SOLUTION INTRAVENOUS at 13:10

## 2024-06-11 RX ADMIN — DEXMEDETOMIDINE HYDROCHLORIDE 4 MCG: 100 INJECTION, SOLUTION INTRAVENOUS at 11:51

## 2024-06-11 RX ADMIN — CEFAZOLIN 2 G: 2 INJECTION, POWDER, FOR SOLUTION INTRAMUSCULAR; INTRAVENOUS at 11:40

## 2024-06-11 RX ADMIN — ESMOLOL HYDROCHLORIDE 20 MG: 100 INJECTION, SOLUTION INTRAVENOUS at 12:35

## 2024-06-11 RX ADMIN — SUGAMMADEX 200 MG: 100 INJECTION, SOLUTION INTRAVENOUS at 13:43

## 2024-06-11 RX ADMIN — HYDROMORPHONE HYDROCHLORIDE 1 MG: 1 INJECTION, SOLUTION INTRAMUSCULAR; INTRAVENOUS; SUBCUTANEOUS at 12:02

## 2024-06-11 RX ADMIN — DEXMEDETOMIDINE HYDROCHLORIDE 8 MCG: 100 INJECTION, SOLUTION INTRAVENOUS at 12:02

## 2024-06-11 RX ADMIN — SODIUM CHLORIDE 2000 MG: 900 INJECTION INTRAVENOUS at 11:25

## 2024-06-11 RX ADMIN — PHENYLEPHRINE HYDROCHLORIDE 200 MCG: 10 INJECTION INTRAVENOUS at 13:00

## 2024-06-11 RX ADMIN — SODIUM CHLORIDE 125 ML/HR: 0.9 INJECTION, SOLUTION INTRAVENOUS at 15:13

## 2024-06-11 RX ADMIN — PHENYLEPHRINE HYDROCHLORIDE 200 MCG: 10 INJECTION INTRAVENOUS at 13:35

## 2024-06-11 RX ADMIN — SCOPALAMINE 1 PATCH: 1 PATCH, EXTENDED RELEASE TRANSDERMAL at 09:38

## 2024-06-11 RX ADMIN — DEXAMETHASONE SODIUM PHOSPHATE 8 MG: 4 INJECTION, SOLUTION INTRAMUSCULAR; INTRAVENOUS at 11:45

## 2024-06-11 RX ADMIN — SODIUM CHLORIDE, SODIUM LACTATE, POTASSIUM CHLORIDE, CALCIUM CHLORIDE: 600; 310; 30; 20 INJECTION, SOLUTION INTRAVENOUS at 11:41

## 2024-06-11 RX ADMIN — FENTANYL CITRATE 100 MCG: 50 INJECTION, SOLUTION INTRAMUSCULAR; INTRAVENOUS at 11:41

## 2024-06-11 RX ADMIN — ROCURONIUM BROMIDE 50 MG: 10 INJECTION, SOLUTION INTRAVENOUS at 11:36

## 2024-06-11 RX ADMIN — SODIUM CHLORIDE, SODIUM LACTATE, POTASSIUM CHLORIDE, AND CALCIUM CHLORIDE: .6; .31; .03; .02 INJECTION, SOLUTION INTRAVENOUS at 11:29

## 2024-06-11 RX ADMIN — DEXMEDETOMIDINE HYDROCHLORIDE 8 MCG: 100 INJECTION, SOLUTION INTRAVENOUS at 11:45

## 2024-06-11 RX ADMIN — SODIUM CHLORIDE 2000 MG: 900 INJECTION INTRAVENOUS at 21:12

## 2024-06-11 RX ADMIN — PHENYLEPHRINE HYDROCHLORIDE 100 MCG: 10 INJECTION INTRAVENOUS at 12:46

## 2024-06-11 RX ADMIN — ESMOLOL HYDROCHLORIDE 20 MG: 100 INJECTION, SOLUTION INTRAVENOUS at 12:52

## 2024-06-11 RX ADMIN — ROCURONIUM BROMIDE 10 MG: 10 INJECTION, SOLUTION INTRAVENOUS at 12:51

## 2024-06-11 RX ADMIN — ROCURONIUM BROMIDE 20 MG: 10 INJECTION, SOLUTION INTRAVENOUS at 12:15

## 2024-06-11 RX ADMIN — ESMOLOL HYDROCHLORIDE 20 MG: 100 INJECTION, SOLUTION INTRAVENOUS at 13:22

## 2024-06-11 NOTE — BRIEF OP NOTE
LAPAROSCOPIC HIATAL HERNIA REPAIR WITH DAVINCI ROBOT  Progress Note    Aure Bernard  6/11/2024    Pre-op Diagnosis:   Hiatal hernia with gastroesophageal reflux [K44.9, K21.9]       Post-Op Diagnosis Codes:     * Hiatal hernia with gastroesophageal reflux [K44.9, K21.9]    Procedure/CPT® Codes:        Procedure(s):  LAPAROSCOPIC HIATAL HERNIA REPAIR WITH DAVINCI ROBOT, Nissen fundoplication  ESOPHAGOGASTRODUODENOSCOPY              Surgeon(s):  John Bynum MD Armstrong, James Edward, MD    Anesthesia: General    Staff:   Circulator: Spencer Greco RN; Muna Gonsales RN  Scrub Person: Shorty Grant  Assistant: Khalida Cat RN CSA  Assistant: Khalida Cat RN CSA      Estimated Blood Loss: minimal    Urine Voided: * No values recorded between 6/11/2024 11:29 AM and 6/11/2024  1:38 PM *    Specimens:                None          Drains: * No LDAs found *    Findings: hiatal hernia repaired with 54Fr Bougie, Nissen performed, EGD with normal post op findings        Complications: none    Assistant: Khalida Cat RN CSA  was responsible for performing the following activities:  Insertion and exchange of robotic instrumentation and suture material, closure of skin, placement of dressings  and their skilled assistance was necessary for the success of this case.          This document has been electronically signed by John Bynum MD on June 11, 2024 13:57 EDT      John Bynum MD     Date: 6/11/2024  Time: 13:56 EDT

## 2024-06-11 NOTE — ANESTHESIA PREPROCEDURE EVALUATION
Anesthesia Evaluation     Patient summary reviewed and Nursing notes reviewed   NPO Solid Status: > 8 hours  NPO Liquid Status: > 8 hours           Airway   Mallampati: II  TM distance: >3 FB  Neck ROM: full  No difficulty expected  Dental - normal exam     Pulmonary    Cardiovascular     (+) hyperlipidemia      Neuro/Psych  (+) seizures, headaches, numbness, psychiatric history Anxiety  GI/Hepatic/Renal/Endo    (+) hiatal hernia, GERD    Musculoskeletal     Abdominal    Substance History      OB/GYN          Other          Other Comment: History Comments History Comments  History of tobacco use  Acute bilateral low back pain without sciatica   Anxiety  GERD with esophagitis   Headache  Seizures hx epilepsy, no meds currently, no seizure activity >30 yr  GERD (gastroesophageal reflux disease)  Hyperlipidemia   Seasonal allergies  Hiatal hernia     Surgical History  Current as of 24    HYSTERECTOMY ENDOSCOPY  COLONOSCOPY    Substance History  Current as of 24    Smoking Status: Former - 2.5 pack years  Quit Smokin93  Passive Exposure: Past  Smokeless Tobacco Status: Never  Alcohol use: Yes; 2.0 standard drinks of alcohol per week  Drug use: No    Anesthesia Family History  Current as of 24                    Anesthesia Plan    ASA 2     general     intravenous induction     Anesthetic plan, risks, benefits, and alternatives have been provided, discussed and informed consent has been obtained with: patient.    Plan discussed with CRNA.    CODE STATUS:

## 2024-06-11 NOTE — ANESTHESIA POSTPROCEDURE EVALUATION
Patient: Aure Bernard    Procedure Summary       Date: 06/11/24 Room / [facility]: [facility] OR 08 / [facility] MA[facility]    Anesthesia Start: 1129 Anesthesia Stop: 1408    Procedures:       LAPAROSCOPIC HIATAL HERNIA REPAIR WITH DAVINCI ROBOT      ESOPHAGOGASTRODUODENOSCOPY Diagnosis:       Hiatal hernia with gastroesophageal reflux      (Hiatal hernia with gastroesophageal reflux [K44.9, K21.9])    Surgeons: Jhon Bynum MD Provider: Piero Young MD    Anesthesia Type: general ASA Status: 2            Anesthesia Type: general    Vitals  Vitals Value Taken Time   /60 06/11/24 1447   Temp 97 °F (36.1 °C) 06/11/24 1445   Pulse 78 06/11/24 1450   Resp 11 06/11/24 1445   SpO2 95 % 06/11/24 1449   Vitals shown include unfiled device data.        Post Anesthesia Care and Evaluation    Patient location during evaluation: PACU  Patient participation: complete - patient participated  Level of consciousness: awake  Pain scale: See nurse's notes for pain score.  Pain management: adequate    Airway patency: patent  Anesthetic complications: No anesthetic complications  PONV Status: none  Cardiovascular status: acceptable  Respiratory status: acceptable and spontaneous ventilation  Hydration status: acceptable    Comments: Patient seen and examined postoperatively; vital signs stable; SpO2 greater than or equal to 90%; cardiopulmonary status stable; nausea/vomiting adequately controlled; pain adequately controlled; no apparent anesthesia complications; patient discharged from anesthesia care when discharge criteria were met

## 2024-06-11 NOTE — ANESTHESIA PROCEDURE NOTES
Peripheral IV    Patient location during procedure: OR  Start time: 6/11/2024 11:47 AM  Line placed for Fluids/Medication Admin.  Performed By   Anesthesiologist: Piero Young MD  CRNA/CAA: Muna Hughes CRNA  Preanesthetic Checklist  Completed: patient identified, IV checked, site marked, risks and benefits discussed, surgical consent, monitors and equipment checked, pre-op evaluation and timeout performed  Peripheral IV Prep   Patient position: supine   Prep: alcohol swabs  Patient monitoring: heart rate, cardiac monitor and continuous pulse ox  Peripheral IV Procedure   Laterality:left  Location:  Hand  Catheter size: 20 G          Post Assessment   Dressing Type: transparent and tape.    IV Dressing/Site: clean, dry and intact

## 2024-06-11 NOTE — ANESTHESIA PROCEDURE NOTES
Airway  Urgency: elective    Date/Time: 6/11/2024 11:44 AM  Airway not difficult    General Information and Staff    Patient location during procedure: OR  Anesthesiologist: Piero Young MD  CRNA/CAA: Muna Hughes CRNA    Indications and Patient Condition  Indications for airway management: airway protection    Preoxygenated: yes  MILS maintained throughout  Mask difficulty assessment: 1 - vent by mask    Final Airway Details  Final airway type: endotracheal airway      Successful airway: ETT  Cuffed: yes   Successful intubation technique: direct laryngoscopy  Endotracheal tube insertion site: oral  Blade: Lana  Blade size: 3  ETT size (mm): 7.0  Cormack-Lehane Classification: grade I - full view of glottis  Placement verified by: chest auscultation   Cuff volume (mL): 8  Measured from: gums  ETT/EBT to gums (cm): 20  Number of attempts at approach: 1  Assessment: lips, teeth, and gum same as pre-op and atraumatic intubation

## 2024-06-11 NOTE — PROGRESS NOTES
Patient seen and examined postoperatively.  Appears to be doing well.  Will reassess in the morning.          This document has been electronically signed by John Bynum MD on June 11, 2024 19:49 EDT

## 2024-06-11 NOTE — H&P
CHIEF COMPLAINT:    Hiatal hernia, reflux    HISTORY OF PRESENT ILLNESS:    Aure Bernard is a 54 y.o. female who has been seen previously for reflux and hiatal hernia.  She has completed her workup and is here today for hiatal hernia repair with fundoplication.  She was accompanied by her  in preop and notes no new symptoms or questions today.    Past Medical History:   Diagnosis Date    Acute bilateral low back pain without sciatica     Anxiety     GERD (gastroesophageal reflux disease)     GERD with esophagitis     Headache     Hiatal hernia     History of tobacco use 12/05/2019    Hyperlipidemia     Seasonal allergies     Seizures     hx epilepsy, no meds currently, no seizure activity >30 yr       Past Surgical History:   Procedure Laterality Date    COL[facility]OSCOPY  [facility]08/2024    [facility]    END[facility]  [facility]08/2024    [facility]    HYSTERECTOMY      bleeding, pain. no cancer       Prior to Admission medications    Medication Sig Start Date End Date Taking? Authorizing Provider   ASHWAGANDHA PO Take  by mouth Every Night.   Yes Rene Olivas MD   coenzyme Q10 100 MG capsule Take 1 capsule by mouth Daily.   Yes Rene Olivas MD   fexofenadine (ALLEGRA) 180 MG tablet Take 1 tablet by mouth Every Night.   Yes Rene Olivas MD   MAGNESIUM PO Take 1 capsule by mouth Daily.   Yes Rene Olivas MD   Omega-3 Fatty Acids (fish oil) 1000 MG capsule capsule Take  by mouth Daily With Breakfast.   Yes Rene Olivas MD   pantoprazole (PROTONIX) 40 MG EC tablet Take 1 tablet by mouth Daily. 4/26/24  Yes Alia Roe MD   polyethylene glycol (MIRALAX) 17 GM/SCOOP powder Take 17 g by mouth Daily.   Yes Rene Olivas MD   rosuvastatin (CRESTOR) 5 MG tablet Take 1 tablet by mouth Every Night.  Patient taking differently: Take 1 tablet by mouth Every Night. QOD 1/26/24  Yes Alia Roe MD   triamcinolone (KENALOG) 0.1 % ointment Apply 1 application  topically to the appropriate area  "as directed 2 (Two) Times a Day.  Patient taking differently: Apply 1 Application topically to the appropriate area as directed 2 (Two) Times a Day As Needed. 23  Yes Alia Roe MD   vitamin B-12 (CYANOCOBALAMIN) 1000 MCG tablet Take 1 tablet by mouth Daily.   Yes Provider, MD Rene       No Known Allergies    Family History   Problem Relation Age of Onset    Colon cancer Maternal Grandfather     Breast cancer Neg Hx     Ovarian cancer Neg Hx        Social History     Socioeconomic History    Marital status:    Tobacco Use    Smoking status: Former     Current packs/day: 0.00     Average packs/day: 0.5 packs/day for 5.0 years (2.5 ttl pk-yrs)     Types: Cigarettes     Start date:      Quit date:      Years since quittin.4     Passive exposure: Past    Smokeless tobacco: Never   Vaping Use    Vaping status: Never Used   Substance and Sexual Activity    Alcohol use: Yes     Alcohol/week: 2.0 standard drinks of alcohol     Types: 2 Glasses of wine per week    Drug use: No    Sexual activity: Defer       Review of Systems   Gastrointestinal:         Reflux       Objective     /77 (Patient Position: Lying)   Pulse 71   Temp 98.3 °F (36.8 °C) (Oral)   Resp 12   Ht 154.9 cm (61\")   Wt 62.2 kg (137 lb 3.2 oz)   SpO2 98%   BMI 25.92 kg/m²     Physical Exam  Constitutional:       General: She is not in acute distress.     Appearance: Normal appearance. She is not ill-appearing, toxic-appearing or diaphoretic.   HENT:      Head: Normocephalic and atraumatic.   Eyes:      General:         Right eye: No discharge.         Left eye: No discharge.      Extraocular Movements: Extraocular movements intact.      Conjunctiva/sclera: Conjunctivae normal.   Pulmonary:      Effort: Pulmonary effort is normal. No respiratory distress.   Abdominal:      General: There is no distension.      Palpations: Abdomen is soft. There is no mass.      Tenderness: There is no abdominal tenderness. " There is no guarding or rebound.      Hernia: No hernia is present.   Skin:     General: Skin is warm.      Coloration: Skin is not jaundiced.   Neurological:      General: No focal deficit present.      Mental Status: She is alert and oriented to person, place, and time.   Psychiatric:         Mood and Affect: Mood normal.         Behavior: Behavior normal.         Thought Content: Thought content normal.         Judgment: Judgment normal.           ASSESSMENT:    Hiatal hernia with reflux    PLAN:    She is here today for robotic hiatal hernia repair with fundoplication.  The risks and benefits of this have been reviewed with her previously and no further questions were asked today.  Will plan for surgery as scheduled.          This document has been electronically signed by John Bynum MD on June 11, 2024 09:55 EDT

## 2024-06-12 VITALS
HEIGHT: 61 IN | HEART RATE: 86 BPM | TEMPERATURE: 98.3 F | SYSTOLIC BLOOD PRESSURE: 126 MMHG | WEIGHT: 137.2 LBS | RESPIRATION RATE: 22 BRPM | DIASTOLIC BLOOD PRESSURE: 74 MMHG | OXYGEN SATURATION: 91 % | BODY MASS INDEX: 25.9 KG/M2

## 2024-06-12 PROBLEM — K44.9 HIATAL HERNIA WITH GASTROESOPHAGEAL REFLUX: Status: RESOLVED | Noted: 2024-05-20 | Resolved: 2024-06-12

## 2024-06-12 PROBLEM — K44.9 HIATAL HERNIA: Status: RESOLVED | Noted: 2023-12-29 | Resolved: 2024-06-12

## 2024-06-12 PROBLEM — K21.9 HIATAL HERNIA WITH GASTROESOPHAGEAL REFLUX: Status: RESOLVED | Noted: 2024-05-20 | Resolved: 2024-06-12

## 2024-06-12 PROCEDURE — 99024 POSTOP FOLLOW-UP VISIT: CPT | Performed by: SURGERY

## 2024-06-12 PROCEDURE — 97161 PT EVAL LOW COMPLEX 20 MIN: CPT

## 2024-06-12 PROCEDURE — 25810000003 SODIUM CHLORIDE 0.9 % SOLUTION: Performed by: SURGERY

## 2024-06-12 PROCEDURE — 25010000002 CEFAZOLIN PER 500 MG: Performed by: SURGERY

## 2024-06-12 RX ORDER — HYDROCODONE BITARTRATE AND ACETAMINOPHEN 5; 325 MG/1; MG/1
1 TABLET ORAL EVERY 6 HOURS PRN
Qty: 20 TABLET | Refills: 0 | Status: SHIPPED | OUTPATIENT
Start: 2024-06-12 | End: 2024-06-26

## 2024-06-12 RX ADMIN — SODIUM CHLORIDE 125 ML/HR: 0.9 INJECTION, SOLUTION INTRAVENOUS at 08:12

## 2024-06-12 RX ADMIN — PANTOPRAZOLE SODIUM 40 MG: 40 TABLET, DELAYED RELEASE ORAL at 04:56

## 2024-06-12 RX ADMIN — ACETAMINOPHEN 650 MG: 325 TABLET, FILM COATED ORAL at 04:56

## 2024-06-12 RX ADMIN — SODIUM CHLORIDE 2000 MG: 900 INJECTION INTRAVENOUS at 04:56

## 2024-06-12 NOTE — PROGRESS NOTES
GENERAL SURGERY PROGRESS NOTE  Chief Complaint:  Surgery Follow up   LOS: 0 days       Subjective     Interval History:     Feels well.  Has been tolerating liquids.  Minimal postoperative discomfort.    Objective     Vital Signs  Temp:  [97 °F (36.1 °C)-98.3 °F (36.8 °C)] 98.3 °F (36.8 °C)  Heart Rate:  [] 86  Resp:  [11-22] 22  BP: (107-134)/(43-80) 126/74    Physical Exam:   Abdomen soft, incisions clean dry and intact  Labs:  Lab Results (last 24 hours)       Procedure Component Value Units Date/Time    Basic Metabolic Panel [[id number]]  (Abnormal) Collected: 06/11/24 2212    Specimen: Blood Updated: 06/11/24 2235     Glucose 144 mg/dL      BUN 13 mg/dL      Creatinine 0.88 mg/dL      Sodium 139 mmol/L      Potassium 3.9 mmol/L      Chloride 105 mmol/L      CO2 22.3 mmol/L      Calcium 8.7 mg/dL      BUN/Creatinine Ratio 14.8     Anion Gap 11.7 mmol/L      eGFR 79.2 mL/min/1.73     Narrative:      GFR Normal >60  Chronic Kidney Disease <60  Kidney Failure <15      CBC & Differential [[id number]]  (Abnormal) Collected: 06/11/24 2212    Specimen: Blood Updated: 06/11/24 2218    Narrative:      The following orders were created for panel order CBC & Differential.  Procedure                               Abnormality         Status                     ---------                               -----------         ------                     CBC Auto Differential[[id number]]        Abnormal            Final result                 Please view results for these tests on the individual orders.    CBC Auto Differential [[id number]]  (Abnormal) Collected: 06/11/24 2212    Specimen: Blood Updated: 06/11/24 2218     WBC 8.89 10*3/mm3      RBC 4.38 10*6/mm3      Hemoglobin 13.1 g/dL      Hematocrit 40.1 %      MCV 91.6 fL      MCH 29.9 pg      MCHC 32.7 g/dL      RDW 11.9 %      RDW-SD 39.8 fl      MPV 9.8 fL      Platelets 267 10*3/mm3      Neutrophil % 92.7 %      Lymphocyte % 6.0 %      Monocyte % 1.0 %      Eosinophil %  0.0 %      Basophil % 0.1 %      Immature Grans % 0.2 %      Neutrophils, Absolute 8.24 10*3/mm3      Lymphocytes, Absolute 0.53 10*3/mm3      Monocytes, Absolute 0.09 10*3/mm3      Eosinophils, Absolute 0.00 10*3/mm3      Basophils, Absolute 0.01 10*3/mm3      Immature Grans, Absolute 0.02 10*3/mm3      nRBC 0.0 /100 WBC     POC Glucose Once [[id number]]  (Abnormal) Collected: 06/11/24 1405    Specimen: Blood Updated: 06/11/24 1407     Glucose 180 mg/dL      Comment: Serial Number: [id number]:  [id number]                Results Review:     Labs and imaging for today were reviewed.    Assessment & Plan     Aure Bernard is a 54 y.o. female who is status post hiatal hernia repair and Nissen yesterday      Overall appears to be doing well.  Will plan for discharge home today.  She was given a packet of dietary information for appropriate post Nissen diet.  Will plan to see her back in the office in about 2 weeks.          This document has been electronically signed by John Bynum MD on June 12, 2024 10:57 EDT        John Bynum MD  06/12/24  10:57 EDT

## 2024-06-12 NOTE — PLAN OF CARE
Goal Outcome Evaluation:              Outcome Evaluation: pt doing well with no complications. Pain is well controlled and tolerating diet. Will discharge today.

## 2024-06-12 NOTE — PLAN OF CARE
"Goal Outcome Evaluation:  Plan of Care Reviewed With: patient        Progress: no change  Outcome Evaluation: Pt is a 55 y/o F POD #1 laproscopic hiatal hernia repair w/ robot. At baseline, pt lives w/  in [facility] w/ 1 KE and was independent all household/community mobility w/ no AD, still driving and working full-time. During eval, pt demonstrates MOD I with bed mobility, transfers, and gait training 400 ft with no AD and no LOB/unsteadiness or increase in pain. Pt has reportedly ambulated \"two laps around the unit twice\" prior and has been up ab-ida in room. Anticipate safe return home w/  with no onging skilled PT needs. Eval only.      Anticipated Discharge Disposition (PT): home                        "

## 2024-06-12 NOTE — CASE MANAGEMENT/SOCIAL WORK
Case Management Discharge Note      Final Note: Routine home.         Selected Continued Care - Admitted Since 6/11/2024          Transportation Services  Private: Car    Final Discharge Disposition Code: 01 - home or self-care    Discharge Planning Assessment   Abdias     Patient Name: Aure Bernard  MRN: [id number]  Today's Date: 6/12/2024    Admit Date: 6/11/2024    Plan: Return home w/ .   Discharge Needs Assessment       Row Name 06/12/24 1232       Living Environment    People in Home spouse    Name(s) of People in Home  Cornelio    Current Living Arrangements home    Potentially Unsafe Housing Conditions none    In the past 12 months has the electric, gas, oil, or water company threatened to shut off services in your home? No    Primary Care Provided by self    Provides Primary Care For no one    Family Caregiver if Needed spouse    Family Caregiver Names Cornelio    Quality of Family Relationships helpful;involved;supportive    Able to Return to Prior Arrangements yes       Resource/Environmental Concerns    Resource/Environmental Concerns none    Transportation Concerns none       Transportation Needs    In the past 12 months, has lack of transportation kept you from medical appointments or from getting medications? no    In the past 12 months, has lack of transportation kept you from meetings, work, or from getting things needed for daily living? No       Food Insecurity    Within the past 12 months, you worried that your food would run out before you got the money to buy more. Never true    Within the past 12 months, the food you bought just didn't last and you didn't have money to get more. Never true       Transition Planning    Patient/Family Anticipates Transition to home with family    Patient/Family Anticipated Services at Transition none    Transportation Anticipated car, drives self;family or friend will provide       Discharge Needs Assessment    Readmission Within the Last 30 Days no  previous admission in last 30 days    Equipment Currently Used at Home none    Concerns to be Addressed denies needs/concerns at this time    Anticipated Changes Related to Illness none    Equipment Needed After Discharge none    Provided Post Acute Provider List? N/A    Provided Post Acute Provider Quality & Resource List? N/A                   Discharge Plan       Row Name 06/12/24 1233       Plan    Plan Return home w/ .    Patient/Family in Agreement with Plan yes    Provided Post Acute Provider List? N/A    Provided Post Acute Provider Quality & Resource List? N/A    Plan Comments CM met with patient and daughter at bedside. Patient states she lives at home alone, independent with ADLs, drives self, denies current DME. Patient confirms PCP [facility] and agreeable to enrollment in [id number] program. Patient denies difficulty affording food, medications, or utilities. Patient denies current HHC or therapy. Patient states at time of discharge her daughter will provide transportation.      Row Name 06/12/24 1110       Plan    Final Discharge Disposition Code 01 - home or self-care    Final Note Routine home.                  Continued Care and Services - Discharged on 6/12/2024 Admission date: 6/11/2024 - Discharge disposition: Home or Self Care   No active coordination exists for this encounter.       Expected Discharge Date and Time       Expected Discharge Date Expected Discharge Time    Jun 12, 2024            Demographic Summary       Row Name 06/12/24 1156       General Information    Admission Type --  Outpatient    Arrived From emergency department    Required Notices Provided Observation Status Notice    Referral Source admission list    Reason for Consult discharge planning;care coordination/care conference    Preferred Language English       Contact Information    Permission Granted to Share Info With                    Functional Status       Row Name 06/12/24 1232       Functional Status     Usual Activity Tolerance good    Current Activity Tolerance good       Functional Status, IADL    Medications independent    Meal Preparation independent    Housekeeping independent    Laundry independent    Shopping independent       Mental Status    General Appearance WDL WDL       Mental Status Summary    Recent Changes in Mental Status/Cognitive Functioning no changes               Inez Pate RN    Office Phone [phone]

## 2024-06-12 NOTE — PLAN OF CARE
Goal Outcome Evaluation:      No complaints of pain in abdomen, gas pain present. Ambulated twice around unit. Nausea improved with Zofran. Call light within reach, plan of care on going.

## 2024-06-12 NOTE — DISCHARGE SUMMARY
Discharge Summary  Date: 06/12/24  Service: General Surgery  Attending: John Bynum    Procedures: Robotic hiatal hernia repair with Nissen fundoplication, EGD  Consults: None  Discharge Diagnoses: Hiatal hernia with reflux  Hospital Course: The patient stayed in the hospital following robotic hiatal hernia repair and Nissen.  She did well overnight on liquids.  Her labs and vitals were acceptable on postoperative day #1.  She was ambulatory and her pain was well-managed.  She appeared to be suitable for discharge home.    Condition at time of Discharge: Stable  Discharge Diet: Post Nissen diet, instructions given the patient prior to discharge      Discharge Medications:     Your medication list        START taking these medications        Instructions Last Dose Given Next Dose Due   HYDROcodone-acetaminophen 5-325 MG per tablet  Commonly known as: NORCO      Take 1 tablet by mouth Every 6 (Six) Hours As Needed (Pain).              CONTINUE taking these medications        Instructions Last Dose Given Next Dose Due   ASHWAGANDHA PO      Take  by mouth Every Night.       coenzyme Q10 100 MG capsule      Take 1 capsule by mouth Daily.       fexofenadine 180 MG tablet  Commonly known as: ALLEGRA      Take 1 tablet by mouth Every Night.       fish oil 1000 MG capsule capsule      Take  by mouth Daily With Breakfast.       MAGNESIUM PO      Take 1 capsule by mouth Daily.       pantoprazole 40 MG EC tablet  Commonly known as: PROTONIX      Take 1 tablet by mouth Daily.       polyethylene glycol 17 GM/SCOOP powder  Commonly known as: MIRALAX      Take 17 g by mouth Daily.       rosuvastatin 5 MG tablet  Commonly known as: CRESTOR      Take 1 tablet by mouth Every Night.       triamcinolone 0.1 % ointment  Commonly known as: KENALOG      Apply 1 application  topically to the appropriate area as directed 2 (Two) Times a Day.       vitamin B-12 1000 MCG tablet  Commonly known as: CYANOCOBALAMIN      Take 1 tablet by mouth  Daily.                 Where to Get Your Medications        These medications were sent to [facility] [address] [place] [address]      Hours: Monday to Friday 7 AM to 7 PM Phone: [phone]   HYDROcodone-acetaminophen 5-325 MG per tablet         Activity Instructions       Discharge Activity      1) No driving while taking narcotics.   2) May shower, no tub bath or submerging incisions.  3) Do not lift / push / pull more than 15 lbs.            Follow-up with Dr. Bynum in 2 weeks              This document has been electronically signed by John Bynum MD on June 12, 2024 11:00 EDT

## 2024-06-12 NOTE — THERAPY EVALUATION
Patient Name: Aure Bernard  : 1970    MRN: [id number]                              Today's Date: 2024       Admit Date: 2024    Visit Dx:     ICD-10-CM ICD-9-CM   1. Hiatal hernia with gastroesophageal reflux  K44.9 553.3    K21.9 530.81     Patient Active Problem List   Diagnosis    Allergic rhinitis due to pollen    Anxiety    Convulsions    Daytime somnolence    Dense breast    Gastroesophageal reflux disease    Irritable colon    Encounter for screening mammogram for malignant neoplasm of breast    Encounter for annual general medical examination with abnormal findings in adult    History of tobacco use    H/O: hysterectomy    Screening mammogram, encounter for    Menopausal symptom    Mixed hyperlipidemia    Right elbow joint crepitus    Tension headache    Acute pain of both knees    Polyarthralgia    Right sided sciatica    RMSF ([place] spotted fever)    Other insomnia    Grief    Tick bite of left wrist    Hiatal hernia    Acute pain of left shoulder    Biceps tendinitis of left upper extremity    Scalp psoriasis    Colon cancer screening    Hiatal hernia with gastroesophageal reflux     Past Medical History:   Diagnosis Date    Acute bilateral low back pain without sciatica     Anxiety     GERD (gastroesophageal reflux disease)     GERD with esophagitis     Headache     Hiatal hernia     History of tobacco use 2019    Hyperlipidemia     Seasonal allergies     Seizures     hx epilepsy, no meds currently, no seizure activity >30 yr     Past Surgical History:   Procedure Laterality Date    COLONOSCOPY  2024    [facility]    ENDOSCOPY  2024    [facility]    HYSTERECTOMY      bleeding, pain. no cancer      General Information       Row Name 24 0915          Physical Therapy Time and Intention    Document Type evaluation  -AO     Mode of Treatment physical therapy  -AO       Row Name 24 0915          General Information    Patient Profile Reviewed yes  -AO     Prior Level  of Function --  Independent all household/community mobility w/ no AD, still driving and working full-time  -AO     Existing Precautions/Restrictions no known precautions/restrictions  -AO     Barriers to Rehab none identified  -AO       Row Name 06/12/24 0915          Living Environment    People in Home spouse  -AO       Row Name 06/12/24 0915          Home Main Entrance    Number of Stairs, Main Entrance one  -AO       Row Name 06/12/24 0915          Stairs Within Home, Primary    Number of Stairs, Within Home, Primary none  -AO       Row Name 06/12/24 0915          Cognition    Orientation Status (Cognition) oriented x 4  -AO               User Key  (r) = Recorded By, (t) = Taken By, (c) = Cosigned By      Initials Name Provider Type    Arcelia Parra, PT Physical Therapist                   Mobility       Row Name 06/12/24 0915          Bed Mobility    Bed Mobility bed mobility (all) activities  -AO     All Activities, Enigma (Bed Mobility) modified independence  -AO       Row Name 06/12/24 0915          Sit-Stand Transfer    Sit-Stand Enigma (Transfers) modified independence  -AO       Row Name 06/12/24 0915          Gait/Stairs (Locomotion)    Enigma Level (Gait) modified independence  -AO     Distance in Feet (Gait) 400  -AO     Deviations/Abnormal Patterns (Gait) gait speed decreased;zaki decreased  -AO               User Key  (r) = Recorded By, (t) = Taken By, (c) = Cosigned By      Initials Name Provider Type    Arcelia Parra, PT Physical Therapist                   Obj/Interventions       Row Name 06/12/24 0915          Range of Motion Comprehensive    General Range of Motion no range of motion deficits identified  -AO       Row Name 06/12/24 0915          Strength Comprehensive (MMT)    General Manual Muscle Testing (MMT) Assessment no strength deficits identified  -AO       Row Name 06/12/24 0915          Balance    Balance Assessment sitting static balance;sitting  "dynamic balance;standing static balance;sit to stand dynamic balance;standing dynamic balance  -AO     Static Sitting Balance independent  -AO     Dynamic Sitting Balance independent  -AO     Position, Sitting Balance unsupported;sitting edge of bed  -AO     Static Standing Balance modified independence  -AO     Dynamic Standing Balance modified independence  -AO     Position/Device Used, Standing Balance unsupported  -AO       Row Name 06/12/24 0915          Sensory Assessment (Somatosensory)    Sensory Assessment (Somatosensory) sensation intact  -AO               User Key  (r) = Recorded By, (t) = Taken By, (c) = Cosigned By      Initials Name Provider Type    AO Arcelia Eastman, PT Physical Therapist                   Goals/Plan    No documentation.                  Clinical Impression       Row Name 06/12/24 0915          Pain    Pretreatment Pain Rating 0/10 - no pain  -AO     Posttreatment Pain Rating 0/10 - no pain  -AO       Row Name 06/12/24 0915          Plan of Care Review    Plan of Care Reviewed With patient  -AO     Progress no change  -AO     Outcome Evaluation Pt is a 55 y/o F POD #1 laproscopic hiatal hernia repair w/ robot. At baseline, pt lives w/  in [facility] w/ 1 KE and was independent all household/community mobility w/ no AD, still driving and working full-time. During eval, pt demonstrates MOD I with bed mobility, transfers, and gait training 400 ft with no AD and no LOB/unsteadiness or increase in pain. Pt has reportedly ambulated \"two laps around the unit twice\" prior and has been up ab-ida in room. Anticipate safe return home w/  with no onging skilled PT needs. Eval only.  -AO       Row Name 06/12/24 0915          Therapy Assessment/Plan (PT)    Criteria for Skilled Interventions Met (PT) no;no problems identified which require skilled intervention  -AO     Therapy Frequency (PT) evaluation only  -AO       Row Name 06/12/24 0915          Vital Signs    Recovery Time WNL " throughout session  -AO       Row Name 06/12/24 0915          Positioning and Restraints    Pre-Treatment Position in bed  -AO     Post Treatment Position bed  -AO     In Bed notified nsg;supine;with family/caregiver  -AO               User Key  (r) = Recorded By, (t) = Taken By, (c) = Cosigned By      Initials Name Provider Type    AO Arcelia Eastman, PT Physical Therapist                   Outcome Measures       Row Name 06/12/24 0801          How much help from another person do you currently need...    Turning from your back to your side while in flat bed without using bedrails? 4  -LB     Moving from lying on back to sitting on the side of a flat bed without bedrails? 4  -LB     Moving to and from a bed to a chair (including a wheelchair)? 4  -LB     Standing up from a chair using your arms (e.g., wheelchair, bedside chair)? 4  -LB     Climbing 3-5 steps with a railing? 4  -LB     To walk in hospital room? 4  -LB     AM-PAC 6 Clicks Score (PT) 24  -LB     Highest Level of Mobility Goal 8 --> Walked 250 feet or more  -LB               User Key  (r) = Recorded By, (t) = Taken By, (c) = Cosigned By      Initials Name Provider Type    LB Barbara Vail, RN Registered Nurse                                 Physical Therapy Education       Title: PT OT SLP Therapies (Done)       Topic: Physical Therapy (Done)       Point: Mobility training (Done)       Learning Progress Summary             Patient Acceptance, E,TB, VU by AO at 6/12/2024 1030                         Point: Precautions (Done)       Learning Progress Summary             Patient Acceptance, E,TB, VU by AO at 6/12/2024 1030                                         User Key       Initials Effective Dates Name Provider Type Discipline    AO 06/16/21 -  Arcelia Eastman, PT Physical Therapist PT                  PT Recommendation and Plan     Plan of Care Reviewed With: patient  Progress: no change  Outcome Evaluation: Pt is a 53 y/o F POD #1  "laproscopic hiatal hernia repair w/ robot. At baseline, pt lives w/  in [facility] w/ 1 KE and was independent all household/community mobility w/ no AD, still driving and working full-time. During eval, pt demonstrates MOD I with bed mobility, transfers, and gait training 400 ft with no AD and no LOB/unsteadiness or increase in pain. Pt has reportedly ambulated \"two laps around the unit twice\" prior and has been up ab-ida in room. Anticipate safe return home w/  with no onging skilled PT needs. Eval only.     Time Calculation:   PT Evaluation Complexity  History, PT Evaluation Complexity: no personal factors and/or comorbidities  Examination of Body Systems (PT Eval Complexity): 1-2 elements  Clinical Presentation (PT Evaluation Complexity): stable  Clinical Decision Making (PT Evaluation Complexity): low complexity  Overall Complexity (PT Evaluation Complexity): low complexity     PT Charges       Row Name 06/12/24 1031             Time Calculation    Start Time 0915  -AO      Stop Time 0925  -AO      Time Calculation (min) 10 min  -AO      PT Received On 06/12/24  -AO         Time Calculation- PT    Total Timed Code Minutes- PT 0 minute(s)  -AO                User Key  (r) = Recorded By, (t) = Taken By, (c) = Cosigned By      Initials Name Provider Type    Arcelia Parra, PT Physical Therapist                  Therapy Charges for Today       Code Description Service Date Service Provider Modifiers Qty    [id number]  PT EVAL LOW COMPLEXITY 3 6/12/2024 Arcelia Eastman, PT GP 1            PT G-Codes  AM-PAC 6 Clicks Score (PT): 24  PT Discharge Summary  Anticipated Discharge Disposition (PT): home    Arcelia Eastman PT  6/12/2024    "

## 2024-06-13 ENCOUNTER — TELEPHONE (OUTPATIENT)
Dept: SURGERY | Facility: CLINIC | Age: 54
End: 2024-06-13
Payer: COMMERCIAL

## 2024-06-13 NOTE — CASE MANAGEMENT/SOCIAL WORK
Case Management Discharge Note      Final Note: HOME    Provided Post Acute Provider List?: N/A  Provided Post Acute Provider Quality & Resource List?: N/A    Selected Continued Care - Discharged on 6/12/2024 Admission date: 6/11/2024 - Discharge disposition: Home or Self Care              Transportation Services  Private: Car    Final Discharge Disposition Code: 01 - home or self-care

## 2024-06-13 NOTE — TELEPHONE ENCOUNTER
I called Aure Bernard to check on them post operatively. We discussed  instructions and post op office visit. Encouraged to call the office with any other questions.

## 2024-06-24 NOTE — OP NOTE
Operative Note    Aure Bernard  6/11/2024    Pre-op Diagnosis:   Hiatal hernia with gastroesophageal reflux [K44.9, K21.9]    Post-op Diagnosis:     Post-Op Diagnosis Codes:     * Hiatal hernia with gastroesophageal reflux [K44.9, K21.9]    Procedure/CPT® Codes:      Procedure(s):  LAPAROSCOPIC HIATAL HERNIA REPAIR WITH DAVINCI ROBOT,   ESOPHAGOGASTRODUODENOSCOPY    Surgeon(s):  John Bynum MD Armstrong, James Edward, MD    Anesthesia: General    Staff:   Circulator: Spencer Greco, JOSE; Muna Gonsales RN  Scrub Person: Shorty Grant  Assistant: Khalida Cat RN CSA    Estimated Blood Loss: minimal    Specimens:                None      Drains: * No LDAs found *    Findings: hiatal hernia repaired with 54Fr Bougie, Nissen performed, EGD with normal post op findings     Complications: None apparent    Indication: Hiatal hernia with gastroesophageal reflux    Operative Note:    The patient was seen and consent was obtained preoperatively.  Following this she was brought to the operating room and placed in supine position on the OR table.  General anesthetic was administered and the patient was orotracheal intubated without incident.  A briefing was performed.  The abdomen was prepped and draped in normal sterile fashion.  A timeout was then performed.    Following timeout local anesthetic was injected in the left upper quadrant of the abdomen where a small skin incision was made to allow optical entry with an 8 mm robotic trocar.  The abdomen was entered without difficulty and insufflated without issue.  Additional ports were then placed under direct visualization and after injection of local anesthetic.  3 additional 8 mm robotic trocars were placed in a line near the umbilicus 1 through the left rectus musculature, the next through the right rectus musculature, the final in the right lower to mid abdomen.  The camera was then inserted into 1 of these port so that we could visualize the left lobe of  the liver in the area of the hiatus.  Additional local anesthetic was injected in the subxiphoid region and a small skin incision was made.  The obturator for an 8 mm trocar was then used to create a fascial defect for placement of a Marlin liver retractor.  The retractor was positioned to retract the left lobe of the liver up and away from the hiatus.  At this point we are able to visualize the hiatus and the patient was noted to have a hiatal hernia.  She was then placed in reverse Trendelenburg position.  The robot was docked and instrumentation was inserted.    The stomach was grasped and retracted in the caudal direction exposing the lesser curve of the stomach and the pars flaccida.  The pars flaccida was opened adjacent to the lesser curve all the way up to the area of the hiatus.  The hiatal hernia sac anterior to the stomach was then identified, grasped and elevated up and away from the right jessica of the diaphragm.  The hiatal hernia sac was then divided all the way to the left jessica of the diaphragm.  The hernia sac was then grasped and retracted caudally.  Loose areolar tissue surrounding the hernia sac was then dissected through using a combination of blunt dissection as well as the vessel sealer.  In doing so the hernia sac was able to be reduced out of the chest without significant difficulty.  The esophagus was then able to be reduced down into the chest for several centimeters as well.    Further dissection was then taken from the right jessica over towards the left jessica to elevate the GE junction up and away from the posterior aspect of the crura.  There appeared to be a good tissue plane here.  In order to complete this portion of the dissection the greater curvature of the stomach was then identified.  A portion of the lesser omentum was then divided away from the greater curvature of the stomach including division of short gastric vessels between the stomach and spleen.  The remainder of the  peritoneum and hernia sac along the left jessica was then divided as well.  At this point I was able to pass an instrument from the right jessica behind the GE junction and anterior to the left jessica.  A Penrose drain was then placed around the esophagus and used to retract the esophagus further down into the abdomen.  Additional loose tissue surrounding the esophagus and the chest was then dissected through using a combination of blunt dissection as well as with the vessel sealer.    At this point I was able to see both crura well and the hernia sac and stomach appeared to be reduced.  Several centimeters of esophageal length.  Making 10 themselves within the abdomen even without traction on the Penrose drain.  Therefore I turned my attention to closure of the hiatus.  Using the Penrose drain to retract the GE junction and esophagus in the anterior and left position I was able to visualize both the right and left crura.  A 2-0 nonabsorbable V-Loc suture was then used to close the hiatus.  During hiatal closure I did attempt to have anesthesia passed a 56 [place] bougie to help size the closure.  However they thought they felt resistance and therefore we used a 54 [place] bougie which passed into the stomach without difficulty.  With this bougie in place I completed the posterior hiatal closure.  There was some space left around the esophagus owing to the slightly smaller size of the bougie.  A figure-of-eight 0 Ethibond suture was placed anteriorly to close the lax tissue at this aspect of the hiatus as well.  Again this was performed with the bougie in place and there was still a small amount of space around the esophagus after hiatal closure.    Attention was then turned to performing the fundoplication.  An instrument was passed posterior to the GE junction from the patient's right to left.  The fundus of the stomach was grasped and drawn posterior to the GE junction.  It appeared to maintain this position well.  A  series of 3-0 Ethibond sutures were then used to create a fairly loose Nissen fundoplication that the 54 Khmer bougie passed through without difficulty.  The final 3-0 Ethibond suture incorporating a small bite of distal esophagus as well.  The hernia sac was displaced inferior to the fundoplication wrap.  Following completion of the fundoplication the bougie was removed.    A bite-block was placed.  A flexible gastroscope was then passed transorally into the esophagus without difficulty.  The esophageal mucosa appeared normal.  The stomach was entered without difficulty.  A retroflexed view of the insufflated stomach was then performed showing an intact and appropriate appearing Nissen wrap.  The stomach was then desufflated and the scope was removed.    The robotic instrumentation was removed.  The Marlin retractor was removed carefully.  All sponge, instrument and needle counts were found to be correct.  The abdomen was then desufflated.  The trocars were removed.  The skin incisions were closed with 4-0 Vicryl and covered with skin glue.  The patient was awakened and returned to recovery.    Assistant: Khalida Cat RN CSA was responsible for performing the following activities:  Insertion and exchange of robotic instrumentation, insertion and retrieval of suture material, closure of skin, placement of dressing  and their skilled assistance was necessary for the success of this case.          This document has been electronically signed by John Bynum MD on June 24, 2024 15:19 TRACIET      Jhon Bynum MD     Date: 6/24/2024  Time: 15:11 TRACIET

## 2024-06-26 ENCOUNTER — OFFICE VISIT (OUTPATIENT)
Dept: SURGERY | Facility: CLINIC | Age: 54
End: 2024-06-26
Payer: COMMERCIAL

## 2024-06-26 VITALS
OXYGEN SATURATION: 99 % | WEIGHT: 134 LBS | SYSTOLIC BLOOD PRESSURE: 104 MMHG | DIASTOLIC BLOOD PRESSURE: 69 MMHG | BODY MASS INDEX: 25.3 KG/M2 | TEMPERATURE: 98.7 F | HEART RATE: 85 BPM | HEIGHT: 61 IN

## 2024-06-26 DIAGNOSIS — Z09 ENCOUNTER FOR FOLLOW-UP: Primary | ICD-10-CM

## 2024-06-26 PROCEDURE — 99024 POSTOP FOLLOW-UP VISIT: CPT | Performed by: SURGERY

## 2024-06-26 NOTE — PROGRESS NOTES
CHIEF COMPLAINT:    Chief Complaint   Patient presents with    Post-op Follow-up     Post op 2 week follow up lap hiatal hernia 6-11-24       HISTORY OF PRESENT ILLNESS:    Aure Bernard is a 54 y.o. female who underwent robotic hiatal hernia repair with fundoplication on 6/11/2022.  She returns today for follow-up.  Overall she has been doing very well at home.  She states that she did not require any narcotic pain medication after discharge home.  She has been eating and drinking without difficulty.  She reports no nausea or bloating.  She has stuck primarily to a full liquid diet and has had it in some noodles and other soft type foods recently with good tolerance.  She reports no reflux.    EXAM:  Vitals:    06/26/24 1500   BP: 104/69   Pulse: 85   Temp: 98.7 °F (37.1 °C)   SpO2: 99%         Abdomen soft, incisions clean dry and intact    ASSESSMENT:    Status post hiatal hernia repair    PLAN:    Overall appears to be doing well.  Can begin to liberalize diet somewhat.  Can begin with tapering off of pantoprazole.  See back in 1 month.  Still no heavy lifting.          This document has been electronically signed by John Bynum MD on June 26, 2024 15:22 EDT

## 2024-07-31 ENCOUNTER — OFFICE VISIT (OUTPATIENT)
Dept: SURGERY | Facility: CLINIC | Age: 54
End: 2024-07-31
Payer: COMMERCIAL

## 2024-07-31 VITALS
HEART RATE: 79 BPM | TEMPERATURE: 98.6 F | WEIGHT: 131 LBS | OXYGEN SATURATION: 97 % | DIASTOLIC BLOOD PRESSURE: 83 MMHG | SYSTOLIC BLOOD PRESSURE: 121 MMHG | BODY MASS INDEX: 24.73 KG/M2 | HEIGHT: 61 IN

## 2024-07-31 DIAGNOSIS — Z09 ENCOUNTER FOR FOLLOW-UP: Primary | ICD-10-CM

## 2024-07-31 PROCEDURE — 99024 POSTOP FOLLOW-UP VISIT: CPT | Performed by: SURGERY

## 2024-07-31 NOTE — PROGRESS NOTES
CHIEF COMPLAINT:    Chief Complaint   Patient presents with    Post-op Follow-up     Post op 4 week follow up lap hiatal hernia repair 6-11-24       HISTORY OF PRESENT ILLNESS:    Aure Bernard is a 54 y.o. female who underwent prior hiatal hernia repair and fundoplication on 6/11/2024.  She returns today for follow-up.  Overall she is doing well.  She states that she has had some increased belching recently.  She has had at least 1 episode of feeling like bread got stuck in her lower esophagus.  She notes no reflux.    EXAM:  Vitals:    07/31/24 1458   BP: 121/83   Pulse: 79   Temp: 98.6 °F (37 °C)   SpO2: 97%         Abdomen soft, incisions well-healed    ASSESSMENT:    Status post hiatal hernia repair, fundoplication    PLAN:    Overall she appears to be doing well.  Can resume normal activities.  Continue to be mindful of diet.  Will see back in 1 month for reassessment.          This document has been electronically signed by John Bynum MD on July 31, 2024 15:22 EDT

## 2025-01-06 DIAGNOSIS — E78.2 MIXED HYPERLIPIDEMIA: ICD-10-CM

## 2025-01-09 RX ORDER — ROSUVASTATIN CALCIUM 5 MG/1
5 TABLET, COATED ORAL NIGHTLY
Qty: 90 TABLET | Refills: 0 | Status: SHIPPED | OUTPATIENT
Start: 2025-01-09 | End: 2025-01-13 | Stop reason: SDUPTHER

## 2025-01-13 DIAGNOSIS — E78.2 MIXED HYPERLIPIDEMIA: ICD-10-CM

## 2025-01-13 RX ORDER — ROSUVASTATIN CALCIUM 5 MG/1
5 TABLET, COATED ORAL NIGHTLY
Qty: 90 TABLET | Refills: 0 | Status: SHIPPED | OUTPATIENT
Start: 2025-01-13

## 2025-01-13 NOTE — TELEPHONE ENCOUNTER
Relationship: Self    Best call back number: [phone] (   Requested Prescriptions:   Requested Prescriptions     Pending Prescriptions Disp Refills    rosuvastatin (CRESTOR) 5 MG tablet 90 tablet 0     Si tablet Every Night.        Pharmacy where request should be sent: [facility] [address] [phone] [facility] [phone] [facility]     Last office visit with prescribing clinician: 2024   Last telemedicine visit with prescribing clinician: Visit date not found   Next office visit with prescribing clinician: Visit date not found     Additional details provided by patient:     Does the patient have less than a 3 day supply:  [] Yes  [] No    Would you like a call back once the refill request has been completed: [] Yes [] No    If the office needs to give you a call back, can they leave a voicemail: [] Yes [] No    Juancho Solis   25 11:17 EST

## 2025-01-23 ENCOUNTER — HOSPITAL ENCOUNTER (OUTPATIENT)
Dept: MAMMOGRAPHY | Facility: HOSPITAL | Age: 55
Discharge: HOME | End: 2025-01-23
Admitting: FAMILY MEDICINE
Payer: COMMERCIAL

## 2025-01-23 DIAGNOSIS — Z12.31 SCREENING MAMMOGRAM, ENCOUNTER FOR: ICD-10-CM

## 2025-01-23 PROCEDURE — 77063 BREAST TOMOSYNTHESIS BI: CPT

## 2025-01-23 PROCEDURE — 77067 SCR MAMMO BI INCL CAD: CPT

## 2025-02-10 NOTE — PROGRESS NOTES
"  Chief Complaint   Patient presents with    Annual Exam         Subjective   Aure Bernard is a 54 y.o. female here for a Annual Visit.     Last Physical Exam: 12/29/23 Previous physical was performed by  Alia Roe MD  General Health:good  Contraceptive History:none  Nutrition:in general, an \"unhealthy\" diet  Exercise Level:irregularly  Sleep:fairly well  Hours of Sleep:6  Libido:poor  Self Skin Exam: occasionally  Monthly Breast Self Exam:Performs monthly self breast exam    Pap Smear:  Last Completed Pap Smear            Discontinued - PAP SMEAR  Discontinued      No completion history exists for this topic.                 Findings on last pap: patient does not recall when last pap was}hysterectomy    Mammogram:   Last Completed Mammogram            MAMMOGRAM (Every 2 Years) Next due on 1/23/2027 01/23/2025  Mammo Screening Digital Tomosynthesis Bilateral With CAD    01/11/2024  Mammo Screening Digital Tomosynthesis Bilateral With CAD    08/04/2022  Mammo Screening Digital Tomosynthesis Bilateral With CAD    01/11/2021  Mammo Screening Digital Tomosynthesis Bilateral With CAD    05/14/2019  SCANNED - MAMMO    Only the first 5 history entries have been loaded, but more history exists.                 was done on approximately 01/23/25 and the result was: Birads I (Normal).    Bone Dexa scan: None    Colonoscopy:   Last Completed Colonoscopy            COLORECTAL CANCER SCREENING (COLONOSCOPY - Every 3 Years) Next due on 5/8/2027 05/08/2024  Colonoscopy, Scan    05/08/2024  Colonoscopy, Scan    05/08/2024  Colonoscopy, Scan    03/19/2018  SCANNED - COLONOSCOPY    03/18/2018  COLONOSCOPY (Done)    Only the first 5 history entries have been loaded, but more history exists.                 Last colonoscopy was 05/08/24 with Tubular Adenoma results repeat in 3 yrs          I personally reviewed and updated the patient's allergies, medications, problem list, and past medical, surgical, social, and " family history.     Allergies:  No Known Allergies    Social History:  Social History     Socioeconomic History    Marital status:    Tobacco Use    Smoking status: Former     Current packs/day: 0.00     Average packs/day: 0.5 packs/day for 5.0 years (2.5 ttl pk-yrs)     Types: Cigarettes     Start date:      Quit date:      Years since quittin.1     Passive exposure: Past    Smokeless tobacco: Never   Vaping Use    Vaping status: Never Used   Substance and Sexual Activity    Alcohol use: Yes     Alcohol/week: 2.0 standard drinks of alcohol     Types: 2 Glasses of wine per week    Drug use: No    Sexual activity: Defer       Family History:  Family History   Problem Relation Age of Onset    Colon cancer Maternal Grandfather     Breast cancer Neg Hx     Ovarian cancer Neg Hx        Past Medical History :  Active Ambulatory Problems     Diagnosis Date Noted    Allergic rhinitis due to pollen 2019    Anxiety 2019    Convulsions 2019    Daytime somnolence 2019    Dense breast 2019    Gastroesophageal reflux disease 2019    Irritable colon 2019    Encounter for screening mammogram for malignant neoplasm of breast 2019    Encounter for annual general medical examination with abnormal findings in adult 2019    History of tobacco use 2019    H/O: hysterectomy 12/15/2020    Screening mammogram, encounter for 12/15/2020    Menopausal symptom 12/15/2020    Mixed hyperlipidemia 2021    Right elbow joint crepitus 2021    Tension headache 2021    Acute pain of both knees 2021    Polyarthralgia 2021    Right sided sciatica 2021    RMSF ([place] spotted fever) 2021    Other insomnia 2021    Grief 2022    Acute pain of left shoulder 2023    Biceps tendinitis of left upper extremity 2023    Scalp psoriasis 2023    Colon cancer screening 2024     Resolved Ambulatory  Problems     Diagnosis Date Noted    Acute bilateral low back pain without sciatica 12/05/2019    Overweight (BMI 25.0-29.9) 12/05/2019    Opacity of lung on imaging study 12/09/2019    Acute pain of right shoulder 12/15/2020    Tick bite of left wrist 06/14/2022    Hiatal hernia 12/29/2023    Hiatal hernia with gastroesophageal reflux 05/20/2024     Past Medical History:   Diagnosis Date    GERD (gastroesophageal reflux disease)     GERD with esophagitis     Headache     Hyperlipidemia     Seasonal allergies     Seizures        Medication List:    Current Outpatient Medications:     ASHWAGANDHA PO, Take  by mouth Every Night., Disp: , Rfl:     coenzyme Q10 100 MG capsule, Take 1 capsule by mouth Daily., Disp: , Rfl:     fexofenadine (ALLEGRA) 180 MG tablet, Take 1 tablet by mouth Every Night., Disp: , Rfl:     MAGNESIUM PO, Take 1 capsule by mouth Daily., Disp: , Rfl:     Omega-3 Fatty Acids (fish oil) 1000 MG capsule capsule, Take  by mouth Daily With Breakfast., Disp: , Rfl:     pantoprazole (PROTONIX) 40 MG EC tablet, Take 1 tablet by mouth Daily., Disp: 30 tablet, Rfl: 3    rosuvastatin (CRESTOR) 5 MG tablet, Take 1 tablet by mouth Every Night., Disp: 90 tablet, Rfl: 3    triamcinolone (KENALOG) 0.1 % ointment, Apply 1 application  topically to the appropriate area as directed 2 (Two) Times a Day. (Patient taking differently: Apply 1 Application topically to the appropriate area as directed 2 (Two) Times a Day As Needed.), Disp: 30 g, Rfl: 3    vitamin B-12 (CYANOCOBALAMIN) 1000 MCG tablet, Take 1 tablet by mouth Daily., Disp: , Rfl:     Wheat Dextrin (BENEFIBER DRINK MIX PO), Take  by mouth., Disp: , Rfl:     Past Surgical History:  Past Surgical History:   Pro[place]    [place]    [facility] [place]    [facility] [place]COPY N/A 6/11/2024    Procedure: ESOPHAGOGASTRODUODENOSCOPY;  Surgeon: John Bynum MD;  Location: [facility] MAIN OR;  Service: General;   "Laterality: N/A;    HIATAL HERNIA REPAIR N/A 6/11/2024    Procedure: LAPAROSCOPIC HIATAL HERNIA REPAIR WITH DAVINCI ROBOT;  Surgeon: John Bynum MD;  Location: [facility] MAIN OR;  Service: Robotics - DaVinci;  Laterality: N/A;    HYSTERECTOMY      bleeding, pain. no cancer       Depression Screen:       2/13/2025     3:55 PM   PHQ-2/PHQ-9 Depression Screening   Little interest or pleasure in doing things Not at all   Feeling down, depressed, or hopeless Not at all   How difficult have these problems made it for you to do your work, take care of things at home, or get along with other people? Not difficult at all       Fall Risk Screen:  KEADI Fall Risk Assessment has not been completed.        Physical Exam:  Vital Signs:  Visit Vitals  /82 (BP Location: Right arm, Patient Position: Sitting, Cuff Size: Adult)   Pulse 77   Temp 97.1 °F (36.2 °C) (Temporal)   Resp 18   Ht 154.9 cm (61\")   Wt 63 kg (138 lb 12.8 oz)   SpO2 99% Comment: ra   BMI 26.23 kg/m²       Body mass index is 26.23 kg/m².      Result Review :                  Assessment and Plan:  Problem List Items Addressed This Visit          Cardiac and Vasculature    Mixed hyperlipidemia    Overview     Start co q 10 100mg and omega 3 1000mg. Eat more tuna, salmon and walnuts. Recheck 3 months  \         Current Assessment & Plan      She is diet controlled  Continue current treatment  Will check labs         Relevant Medications    rosuvastatin (CRESTOR) 5 MG tablet    Other Relevant Orders    Lipid Panel With / Chol / HDL Ratio       Gastrointestinal Abdominal     Gastroesophageal reflux disease    Current Assessment & Plan     Worsening    Limit tobacco, alcohol, caffeine, chocalate, citrus fruits, recumbency after meals and large portions. Discussed link between PPI's and increased risk of hip, wrist, and spine fractures           Relevant Medications    pantoprazole (PROTONIX) 40 MG EC tablet       Health Encounters    Encounter for " annual general medical examination with abnormal findings in adult - Primary    Overview     chart reviewed, updates made  screening UTD         Relevant Orders    POCT urinalysis dipstick, manual (Completed)    CBC & Differential    Comprehensive Metabolic Panel    TSH       Tobacco    History of tobacco use     Other Visit Diagnoses       Overweight with body mass index (BMI) of 26 to 26.9 in adult        Hiatal hernia        Relevant Medications    pantoprazole (PROTONIX) 40 MG EC tablet            BMI is within normal parameters. No other follow-up for BMI required.       An After Visit Summary and PPPS were given to the patient.       Discussed injury prevention, diet and exercise, safe sexual practices, and screening for common diseases. Encouraged use of sunscreen and seatbelts. Discussed timing of  cervical cancer screening. Encouraged monthly self-breast exams, yearly clinical breast exams, and discussed timing of mammograms. Avoidance of tobacco encouraged. Limitation or avoidance of alcohol encouraged. Recommend yearly dental and eye exams. Also discussed monitoring of blood pressure, lipids.         +++++E/M portion medically necessary secondary to new or uncontrolled chronic problem+++++++    Subjective   Aure Bernard is here for:    Chief Complaint   Patient presents with    Annual Exam       Hyperlipidemia  This is a chronic problem. The current episode started more than 1 year ago. She has no history of diabetes or obesity. Pertinent negatives include no chest pain or shortness of breath. Current antihyperlipidemic treatment includes herbal therapy. The current treatment provides moderate improvement of lipids. Risk factors for coronary artery disease include dyslipidemia and post-menopausal.         Physical Exam:  Review of Systems   Respiratory:  Negative for shortness of breath.    Cardiovascular:  Negative for chest pain.        Physical Exam  Vitals and nursing note reviewed.   Constitutional:        General: She is not in acute distress.     Appearance: She is well-developed. She is not diaphoretic.   HENT:      Head: Normocephalic and atraumatic.      Right Ear: Tympanic membrane and external ear normal.      Left Ear: Tympanic membrane and external ear normal.      Nose: Nose normal.      Mouth/Throat:      Pharynx: No oropharyngeal exudate.   Eyes:      General: No scleral icterus.        Right eye: No discharge.         Left eye: No discharge.      Conjunctiva/sclera: Conjunctivae normal.      Pupils: Pupils are equal, round, and reactive to light.   Neck:      Thyroid: No thyromegaly.      Trachea: No tracheal deviation.   Cardiovascular:      Rate and Rhythm: Normal rate and regular rhythm.      Heart sounds: Normal heart sounds. No murmur heard.     No friction rub. No gallop.   Pulmonary:      Effort: Pulmonary effort is normal. No respiratory distress.      Breath sounds: Normal breath sounds. No stridor. No wheezing or rales.   Abdominal:      General: Bowel sounds are normal. There is no distension.      Palpations: Abdomen is soft. There is no mass.      Tenderness: There is no abdominal tenderness. There is no guarding or rebound.   Musculoskeletal:         General: No tenderness or deformity. Normal range of motion.      Cervical back: Normal range of motion and neck supple.   Lymphadenopathy:      Cervical: No cervical adenopathy.   Skin:     General: Skin is warm and dry.      Capillary Refill: Capillary refill takes less than 2 seconds.      Coloration: Skin is not pale.      Findings: No erythema or rash.   Neurological:      Mental Status: She is alert and oriented to person, place, and time.      Cranial Nerves: No cranial nerve deficit.      Sensory: No sensory deficit.      Motor: No tremor, atrophy or abnormal muscle tone.      Coordination: Coordination normal.      Gait: Gait normal.      Deep Tendon Reflexes: Reflexes are normal and symmetric. Reflexes normal.   Psychiatric:          Behavior: Behavior normal.         Thought Content: Thought content normal.         Cognition and Memory: Memory is not impaired. She does not exhibit impaired recent memory or impaired remote memory.         Judgment: Judgment normal.         Assessment and Plan:  Problem List Items Addressed This Visit          Cardiac and Vasculature    Mixed hyperlipidemia    Overview     Start co q 10 100mg and omega 3 1000mg. Eat more tuna, salmon and walnuts. Recheck 3 months  \         Current Assessment & Plan      She is diet controlled  Continue current treatment  Will check labs         Relevant Medications    rosuvastatin (CRESTOR) 5 MG tablet    Other Relevant Orders    Lipid Panel With / Chol / HDL Ratio       Gastrointestinal Abdominal     Gastroesophageal reflux disease    Current Assessment & Plan     Worsening    Limit tobacco, alcohol, caffeine, chocalate, citrus fruits, recumbency after meals and large portions. Discussed link between PPI's and increased risk of hip, wrist, and spine fractures           Relevant Medications    pantoprazole (PROTONIX) 40 MG EC tablet       Health Encounters    Encounter for annual general medical examination with abnormal findings in adult - Primary    Overview     chart reviewed, updates made  screening UTD         Relevant Orders    POCT urinalysis dipstick, manual (Completed)    CBC & Differential    Comprehensive Metabolic Panel    TSH       Tobacco    History of tobacco use     Other Visit Diagnoses       Overweight with body mass index (BMI) of 26 to 26.9 in adult        Hiatal hernia        Relevant Medications    pantoprazole (PROTONIX) 40 MG EC tablet            BMI is within normal parameters. No other follow-up for BMI required.         An After Visit Summary and PPPS were given to the patient.       Discussed injury prevention, diet and exercise, safe sexual practices, and screening for common diseases. Encouraged use of sunscreen and seatbelts. Discussed timing  of  cervical cancer screening. Encouraged monthly self-breast exams, yearly clinical breast exams, and discussed timing of mammograms. Avoidance of tobacco encouraged. Limitation or avoidance of alcohol encouraged. Recommend yearly dental and eye exams. Also discussed monitoring of blood pressure, lipids.

## 2025-02-13 ENCOUNTER — OFFICE VISIT (OUTPATIENT)
Dept: FAMILY MEDICINE CLINIC | Facility: CLINIC | Age: 55
End: 2025-02-13
Payer: COMMERCIAL

## 2025-02-13 VITALS
WEIGHT: 138.8 LBS | BODY MASS INDEX: 26.21 KG/M2 | SYSTOLIC BLOOD PRESSURE: 124 MMHG | OXYGEN SATURATION: 99 % | DIASTOLIC BLOOD PRESSURE: 82 MMHG | HEIGHT: 61 IN | HEART RATE: 77 BPM | TEMPERATURE: 97.1 F | RESPIRATION RATE: 18 BRPM

## 2025-02-13 DIAGNOSIS — E78.2 MIXED HYPERLIPIDEMIA: ICD-10-CM

## 2025-02-13 DIAGNOSIS — Z87.891 HISTORY OF TOBACCO USE: ICD-10-CM

## 2025-02-13 DIAGNOSIS — E66.3 OVERWEIGHT WITH BODY MASS INDEX (BMI) OF 26 TO 26.9 IN ADULT: ICD-10-CM

## 2025-02-13 DIAGNOSIS — Z00.01 ENCOUNTER FOR ANNUAL GENERAL MEDICAL EXAMINATION WITH ABNORMAL FINDINGS IN ADULT: Primary | ICD-10-CM

## 2025-02-13 DIAGNOSIS — K21.9 GASTROESOPHAGEAL REFLUX DISEASE, UNSPECIFIED WHETHER ESOPHAGITIS PRESENT: ICD-10-CM

## 2025-02-13 DIAGNOSIS — K44.9 HIATAL HERNIA: ICD-10-CM
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
## 2025-02-13 PROBLEM — S60.862A: Status: RESOLVED | Noted: 2022-06-14 | Resolved: 2025-02-13

## 2025-02-13 LAB
BILIRUB BLD-MCNC: NEGATIVE MG/DL
CLARITY, POC: CLEAR
COLOR UR: YELLOW
GLUCOSE UR STRIP-MCNC: NEGATIVE MG/DL
KETONES UR QL: NEGATIVE
LEUKOCYTE EST, POC: NEGATIVE
NITRITE UR-MCNC: NEGATIVE MG/ML
PH UR: 7 [PH] (ref 5–8)
PROT UR STRIP-MCNC: NEGATIVE MG/DL
RBC # UR STRIP: NEGATIVE /UL
SP GR UR: 1.01 (ref 1–1.03)
UROBILINOGEN UR QL: NORMAL

## 2025-02-13 PROCEDURE — 99214 OFFICE O/P EST MOD 30 MIN: CPT | Performed by: FAMILY MEDICINE

## 2025-02-13 PROCEDURE — 99396 PREV VISIT EST AGE 40-64: CPT | Performed by: FAMILY MEDICINE

## 2025-02-13 PROCEDURE — 81002 URINALYSIS NONAUTO W/O SCOPE: CPT | Performed by: FAMILY MEDICINE

## 2025-02-13 RX ORDER — PANTOPRAZOLE SODIUM 40 MG/1
40 TABLET, DELAYED RELEASE ORAL DAILY
Qty: 30 TABLET | Refills: 3 | Status: SHIPPED | OUTPATIENT
Start: 2025-02-13

## 2025-02-13 RX ORDER — ROSUVASTATIN CALCIUM 5 MG/1
5 TABLET, COATED ORAL NIGHTLY
Qty: 90 TABLET | Refills: 3 | Status: SHIPPED | OUTPATIENT
Start: 2025-02-13 | End: 2025-02-13 | Stop reason: SDUPTHER

## 2025-02-13 RX ORDER — ROSUVASTATIN CALCIUM 5 MG/1
5 TABLET, COATED ORAL NIGHTLY
Qty: 90 TABLET | Refills: 3 | Status: SHIPPED | OUTPATIENT
Start: 2025-02-13

## 2025-02-14 NOTE — ASSESSMENT & PLAN NOTE
Worsening    Limit tobacco, alcohol, caffeine, chocalate, citrus fruits, recumbency after meals and large portions. Discussed link between PPI's and increased risk of hip, wrist, and spine fractures

## 2025-02-23 LAB
ALBUMIN SERPL-MCNC: 4.5 G/DL (ref 3.8–4.9)
ALP SERPL-CCNC: 73 IU/L (ref 44–121)
ALT SERPL-CCNC: 27 IU/L (ref 0–32)
AST SERPL-CCNC: 18 IU/L (ref 0–40)
BASOPHILS # BLD AUTO: 0.1 X10E3/UL (ref 0–0.2)
BASOPHILS NFR BLD AUTO: 1 %
BILIRUB SERPL-MCNC: 0.6 MG/DL (ref 0–1.2)
BUN SERPL-MCNC: 18 MG/DL (ref 6–24)
BUN/CREAT SERPL: 21 (ref 9–23)
CALCIUM SERPL-MCNC: 9.6 MG/DL (ref 8.7–10.2)
CHLORIDE SERPL-SCNC: 103 MMOL/L (ref 96–106)
CHOLEST SERPL-MCNC: 157 MG/DL (ref 100–199)
CHOLEST/HDLC SERPL: 2.4 RATIO (ref 0–4.4)
CO2 SERPL-SCNC: 24 MMOL/L (ref 20–29)
CREAT SERPL-MCNC: 0.86 MG/DL (ref 0.57–1)
EGFRCR SERPLBLD CKD-EPI 2021: 81 ML/MIN/1.73
EOSINOPHIL # BLD AUTO: 0.2 X10E3/UL (ref 0–0.4)
EOSINOPHIL NFR BLD AUTO: 4 %
ERYTHROCYTE [DISTWIDTH] IN BLOOD BY AUTOMATED COUNT: 12.1 % (ref 11.7–15.4)
GLOBULIN SER CALC-MCNC: 2.5 G/DL (ref 1.5–4.5)
GLUCOSE SERPL-MCNC: 96 MG/DL (ref 70–99)
HCT VFR BLD AUTO: 39.9 % (ref 34–46.6)
HDLC SERPL-MCNC: 65 MG/DL
HGB BLD-MCNC: 13.4 G/DL (ref 11.1–15.9)
IMM GRANULOCYTES # BLD AUTO: 0 X10E3/UL (ref 0–0.1)
IMM GRANULOCYTES NFR BLD AUTO: 0 %
LDLC SERPL CALC-MCNC: 75 MG/DL (ref 0–99)
LYMPHOCYTES # BLD AUTO: 1.6 X10E3/UL (ref 0.7–3.1)
LYMPHOCYTES NFR BLD AUTO: 33 %
MCH RBC QN AUTO: 30.3 PG (ref 26.6–33)
MCHC RBC AUTO-ENTMCNC: 33.6 G/DL (ref 31.5–35.7)
MCV RBC AUTO: 90 FL (ref 79–97)
MONOCYTES # BLD AUTO: 0.3 X10E3/UL (ref 0.1–0.9)
MONOCYTES NFR BLD AUTO: 7 %
NEUTROPHILS # BLD AUTO: 2.7 X10E3/UL (ref 1.4–7)
NEUTROPHILS NFR BLD AUTO: 55 %
PLATELET # BLD AUTO: 291 X10E3/UL (ref 150–450)
POTASSIUM SERPL-SCNC: 4.5 MMOL/L (ref 3.5–5.2)
PROT SERPL-MCNC: 7 G/DL (ref 6–8.5)
RBC # BLD AUTO: 4.42 X10E6/UL (ref 3.77–5.28)
SODIUM SERPL-SCNC: 141 MMOL/L (ref 134–144)
TRIGL SERPL-MCNC: 94 MG/DL (ref 0–149)
TSH SERPL DL<=0.005 MIU/L-ACNC: 1.57 UIU/ML (ref 0.45–4.5)
VLDLC SERPL CALC-MCNC: 17 MG/DL (ref 5–40)
WBC # BLD AUTO: 4.8 X10E3/UL (ref 3.4–10.8)

## 2025-06-24 DIAGNOSIS — K44.9 HIATAL HERNIA: ICD-10-CM

## 2025-06-24 DIAGNOSIS — K21.9 GASTROESOPHAGEAL REFLUX DISEASE, UNSPECIFIED WHETHER ESOPHAGITIS PRESENT: ICD-10-CM

## 2025-06-24 RX ORDER — PANTOPRAZOLE SODIUM 40 MG/1
40 TABLET, DELAYED RELEASE ORAL DAILY
Qty: 30 TABLET | Refills: 3 | Status: SHIPPED | OUTPATIENT
Start: 2025-06-24

## (undated) DEVICE — CUFF SCD HEMOFORCE SEQ CALF STD MD

## (undated) DEVICE — TAPE,STEAM, LEAD FREE,1"X60YDS: Brand: MEDLINE

## (undated) DEVICE — DRN WND EVAC BULB 100CC

## (undated) DEVICE — SOL IRRIG NACL 1000ML

## (undated) DEVICE — GLV SURG SIGNATURE ESSENTIAL PF LTX SZ7.5

## (undated) DEVICE — BLAKE SILICONE DRAIN, 15 FR ROUND, HUBLESS: Brand: BLAKE

## (undated) DEVICE — COLUMN DRAPE

## (undated) DEVICE — TROC BLADLES AIRSEAL/OPTI THRD 8X120MM 1P/U

## (undated) DEVICE — SPNG LAP PREWSH SFTPK 18X18IN STRL PK/5

## (undated) DEVICE — CFF SCD HEMFRCE SEQ CLF STD XL

## (undated) DEVICE — UNDRGLV SURG BIOGEL PIMICROINDICATOR SYNTH SZ8 LF STRL

## (undated) DEVICE — LAPAROVUE VISIBILITY SYSTEM LAPAROSCOPIC SOLUTIONS: Brand: LAPAROVUE

## (undated) DEVICE — PK BARIATRIC 50

## (undated) DEVICE — ADHS SKIN PREMIERPRO EXOFIN TOPICAL HI/VISC .5ML

## (undated) DEVICE — BITEBLOCK ENDO W/STRAP 60F A/ LF DISP

## (undated) DEVICE — VESSEL SEALER EXTEND: Brand: ENDOWRIST

## (undated) DEVICE — SOLUTION,WATER,IRRIGATION,1000ML,STERILE: Brand: MEDLINE

## (undated) DEVICE — ARM DRAPE

## (undated) DEVICE — BLADELESS OBTURATOR: Brand: WECK VISTA

## (undated) DEVICE — SUT ETHIB EXCL 0/0 36IN ETX524H

## (undated) DEVICE — 1/4 IN. X 18 IN. LENGTH: Brand: SILICONE TUBING, PENROSE DRAIN

## (undated) DEVICE — ENDOPATH XCEL WITH OPTIVIEW TECHNOLOGY BLADELESS TROCARS WITH STABILITY SLEEVES: Brand: ENDOPATH XCEL OPTIVIEW

## (undated) DEVICE — GLV SURG BIOGEL LTX PF 7 1/2

## (undated) DEVICE — SEAL

## (undated) DEVICE — ST TBG AIRSEAL BIF FLTR W/ACT/CHARCOAL/FLTR

## (undated) DEVICE — KT SURG TURNOVER 050

## (undated) DEVICE — UNDERGLV SURG BIOGEL INDICAT PF 8 GRN

## (undated) DEVICE — GOWN,REINFORCE,POLY,SIRUS,BREATH SLV,XLG: Brand: MEDLINE

## (undated) DEVICE — ANTIBACTERIAL UNDYED BRAIDED (POLYGLACTIN 910), SYNTHETIC ABSORBABLE SUTURE: Brand: COATED VICRYL

## (undated) DEVICE — SOL IRR NACL 0.9PCT 1000ML